# Patient Record
Sex: FEMALE | Race: BLACK OR AFRICAN AMERICAN | Employment: FULL TIME | URBAN - METROPOLITAN AREA
[De-identification: names, ages, dates, MRNs, and addresses within clinical notes are randomized per-mention and may not be internally consistent; named-entity substitution may affect disease eponyms.]

---

## 2017-07-04 DIAGNOSIS — F41.0 PANIC DISORDER WITHOUT AGORAPHOBIA: ICD-10-CM

## 2017-07-05 RX ORDER — ESCITALOPRAM OXALATE 20 MG/1
TABLET ORAL
Qty: 30 TABLET | Refills: 0 | Status: SHIPPED | OUTPATIENT
Start: 2017-07-05 | End: 2017-07-12

## 2017-07-12 ENCOUNTER — OFFICE VISIT (OUTPATIENT)
Dept: FAMILY MEDICINE CLINIC | Age: 32
End: 2017-07-12

## 2017-07-12 VITALS
DIASTOLIC BLOOD PRESSURE: 68 MMHG | SYSTOLIC BLOOD PRESSURE: 90 MMHG | HEART RATE: 64 BPM | BODY MASS INDEX: 34.31 KG/M2 | RESPIRATION RATE: 12 BRPM | HEIGHT: 64 IN | WEIGHT: 201 LBS

## 2017-07-12 DIAGNOSIS — F41.0 PANIC DISORDER WITHOUT AGORAPHOBIA: Primary | ICD-10-CM

## 2017-07-12 PROCEDURE — 99213 OFFICE O/P EST LOW 20 MIN: CPT | Performed by: FAMILY MEDICINE

## 2017-07-12 RX ORDER — MELOXICAM 15 MG/1
15 TABLET ORAL DAILY
Qty: 30 TABLET | Refills: 1 | Status: SHIPPED | OUTPATIENT
Start: 2017-07-12 | End: 2019-12-19

## 2017-07-12 ASSESSMENT — PATIENT HEALTH QUESTIONNAIRE - PHQ9
SUM OF ALL RESPONSES TO PHQ9 QUESTIONS 1 & 2: 0
SUM OF ALL RESPONSES TO PHQ QUESTIONS 1-9: 0
1. LITTLE INTEREST OR PLEASURE IN DOING THINGS: 0
2. FEELING DOWN, DEPRESSED OR HOPELESS: 0

## 2017-11-08 ENCOUNTER — OFFICE VISIT (OUTPATIENT)
Dept: FAMILY MEDICINE CLINIC | Age: 32
End: 2017-11-08

## 2017-11-08 VITALS
WEIGHT: 193 LBS | HEART RATE: 66 BPM | RESPIRATION RATE: 12 BRPM | SYSTOLIC BLOOD PRESSURE: 100 MMHG | BODY MASS INDEX: 32.95 KG/M2 | DIASTOLIC BLOOD PRESSURE: 66 MMHG | HEIGHT: 64 IN

## 2017-11-08 DIAGNOSIS — F41.0 PANIC DISORDER WITHOUT AGORAPHOBIA: Primary | ICD-10-CM

## 2017-11-08 PROCEDURE — 4004F PT TOBACCO SCREEN RCVD TLK: CPT | Performed by: FAMILY MEDICINE

## 2017-11-08 PROCEDURE — G8417 CALC BMI ABV UP PARAM F/U: HCPCS | Performed by: FAMILY MEDICINE

## 2017-11-08 PROCEDURE — 99213 OFFICE O/P EST LOW 20 MIN: CPT | Performed by: FAMILY MEDICINE

## 2017-11-08 PROCEDURE — G8427 DOCREV CUR MEDS BY ELIG CLIN: HCPCS | Performed by: FAMILY MEDICINE

## 2017-11-08 PROCEDURE — G8482 FLU IMMUNIZE ORDER/ADMIN: HCPCS | Performed by: FAMILY MEDICINE

## 2017-11-08 RX ORDER — ESCITALOPRAM OXALATE 10 MG/1
10 TABLET ORAL DAILY
Qty: 30 TABLET | Refills: 0 | Status: SHIPPED | OUTPATIENT
Start: 2017-11-08 | End: 2018-04-18 | Stop reason: ALTCHOICE

## 2017-11-08 NOTE — PROGRESS NOTES
Subjective:      Patient ID: Robert Maddox is a 28 y.o. female. HPI   CC:  Anxiety d/o    Mood Disorder:  Patient presents for follow-up of anxiety disorder. Current complaints include: worsening anxiety    When patient was last evaluated on 7/12/17, patient reported that she had stopped her anxiety medicine. She had recently been let go from her previous job at [employer], due to being unable to meet job requirement since she was a single mom and her son's  wasn't open as early as they wanted her to start. 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 She wanted to see how she can do off of her anxiety medicine. At her last visit, she is still doing very well off of her medicines. Today, patient states she still loves her job, and doesn't feel like the job brings stress to her life. She states she still does have lots of stresses in her life, especially being a single mom and taking care of her six-year-old son. She does feel that she does better on the Lexapro. She does have some daily baseline anxiety now, I would like to restart the Lexapro. She denies anhedonia, depressed mood, tearfulness, feelings of hopelessness, insomnia, panic attacks, increased use of drugs or alcohol and suicidal thoughts or behavior. Symptoms/signs of eber: none.   Vitals:    11/08/17 1449   BP: 100/66   Pulse: 66   Resp: 12   Weight: 193 lb (87.5 kg)   Height: 5' 3.75\" (1.619 m)       Outpatient Prescriptions Marked as Taking for the 11/8/17 encounter (Office Visit) with Isabell Pulido MD   Medication Sig Dispense Refill    Multiple Vitamin (MULTI VITAMIN DAILY PO) Take by mouth         Past Medical History:   Diagnosis Date    Cervical incompetence     Tobacco dependence        Past Surgical History:   Procedure Laterality Date    APPENDECTOMY  2005       Social History   Substance Use Topics    Smoking status: Current Every Day Smoker     Packs/day: 0.50     Years: 11.00     Types: Cigarettes    Smokeless tobacco: Never Used    Alcohol use 0.0 oz/week      Comment: twice monthly       Family History   Problem Relation Age of Onset    Breast Cancer Maternal Aunt 39    Cancer Maternal Grandmother      brain    Alzheimer's Disease Maternal Grandmother     Cancer Paternal Grandmother      lymphoma    High Blood Pressure Paternal Grandmother     Stroke             Review of Systems  See hpi    Objective:   Physical Exam  Constitutional:   · Reviewed vitals above  · Well Nourished, well developed, no distress       ·   Psych:  · Normal mood and affect  · Normal insight and judgement    Assessment:      See below      Plan:      1. Panic disorder without agoraphobia  Not currently controlled. Restarting Lexapro 10 mg.  Because patient has been on medicine before, told her okay to increase back up to her 20 mg dose after 2 weeks if she felt like this was needed. Asked that she might chart me in 2 weeks to let me know her plan. Reviewed side effects and expected course. Pt reported understanding. Patient promises to report any side effects, and any acute worsening of her symptoms. She does plan to see Dr. Stanton Calero for counseling as well.      15 minutes were spent on this visit, >50% spent with counseling

## 2017-12-07 ENCOUNTER — OFFICE VISIT (OUTPATIENT)
Dept: PSYCHOLOGY | Age: 32
End: 2017-12-07

## 2017-12-07 DIAGNOSIS — F43.10 PTSD (POST-TRAUMATIC STRESS DISORDER): Primary | ICD-10-CM

## 2017-12-07 PROCEDURE — 90791 PSYCH DIAGNOSTIC EVALUATION: CPT | Performed by: PSYCHOLOGIST

## 2017-12-07 NOTE — PATIENT INSTRUCTIONS
1. Schedule follow up with Dr. Ofelia Alejandre  2. If you can get hands on insurance therapy list: look for referrals that are psychologist, , professional clinical counselor  3. Continue to take Lexapro 10 mg at pm  4. Consider melatonin 3 to 5 mg for sleep trouble  5. Return to clinic for Dr. Amina Singh in 4 weeks     Sleep Hygiene Guidelines    Good dental hygiene is important in determining the health of your teeth and gums. We all know we are supposed to brush and floss regularly. Those who do so are more likely to have strong, healthy gums and less cavities. Similarly, good sleep hygiene is important in determining the quality and quantity of your sleep. Below are guidelines for good sleep hygiene practices. Review these guidelines and evaluate how well you practice good sleep hygiene. Caffeine:  Avoid Caffeine 6-8 Hours Before Bedtime       Caffeine disturbs sleep, even in people who do not think they experience a stimulation effect. Individuals with insomnia are often more sensitive to mild stimulants than are normal sleepers. Caffeine is found in items such as coffee, tea, soda, chocolate, and many over-the-counter medications (e.g., Excedrin). Thus, drinking caffeinated beverages should be avoided near bedtime and during the night. You might consider a trial period of no caffeine if you tend to be sensitive to its effects. Nicotine:  Avoid Nicotine Before Bedtime       Although some smokers claim that smoking helps them relax, but nicotine is a stimulant. The initial relaxing effects occur with the initial entry of the nicotine, but as the nicotine builds in the system it produces an effect similar to caffeine. Thus, smoking, dipping, or chewing tobacco should be avoided near bedtime and during the night. Dont smoke to get yourself back to sleep.     Alcohol:  Avoid Alcohol After Dinner       Alcohol often promotes the onset of sleep, but as alcohol is metabolized sleep becomes disturbed and fragmented. Thus, a large amount of alcohol is a poor sleep aid and should not be used as such. Limit alcohol use to small quantities to moderate quantities. Sleeping Pills:  Sleep Medications are Effective Only Temporarily       Scientists have shown that sleep medications lose their effectiveness in about 2 - 4 weeks when taken regularly. Despite advertisements to the contrary, over-the-counter sleeping aids have little impact on sleep beyond the placebo effect. Over time, sleeping pills actually can make sleep problems worse. When sleeping pills have been used for a long period, withdrawal from the medication can lead to an insomnia rebound. Thus, after long-term use, many individuals incorrectly conclude that they need sleeping pills in order to sleep normally. Keep use of sleep pills infrequent, but dont worry if you need t use one on an occasional basis. Regular Exercise       Get regular exercise, preferably 40 minutes each day of an activity that causes sweating. .  Exercise in the late afternoon or early evening seems to aid sleep, although the positive effect often takes several weeks to become noticeable. Exercising sporadically is not likely to improve sleep, and exercise within 2 hours of bedtime may elevate nervous system activity and interfere with sleep onset. Hot Baths  Spending 20 minutes in a tub of hot water an hour or two prior to bedtime may promote sleep and is strongly recommended. Bedroom Environment: Moderate Temperature, Quiet, and Dark       Extremes of heat or cold can disrupt sleep. A quiet environment is more sleep promoting than a noisy one. Noises can be masked with background white noise (such as the noise of a fan) or with earplugs. Bedrooms may be darkened with black-out shades or sleep masks can be worn. Position clocks out-of-sight since clock-watching can increase worry about the effects of lack of sleep.   Be sure your mattress is

## 2017-12-07 NOTE — PROGRESS NOTES
Behavioral Health Consultation  Manav Ramesh PsyD  Psychologist  12/7/2017  2:15 PM      Time spent with Patient: 30 minutes  This is patient's first  [facility]. Reason for Consult:  PTSD  Referring Provider: Elise Valencia MD  [address] [place] [address], [facility] 50    Feedback given to PCP. S:    Presenting Problem (PP):    Last appt: 6/9/16. Stopped medication June of this year, restarted about 1 month ago Lexapro 10 mg. Was supposed to go to [place] on 12/21 for vacation with father, again he let her down, still will have the time off, from 12/21 to 12/18   so will spend this time with her son. Other stressor: ex now  and has infant son. This has been more challenging to watch him be more engaged with his 2nd child than her son. 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 Doing well until pulled into \"middle school\" drama with nursing school in November 2016 which made school worse, pt had stood up for classmate. First half of 2017, mother became sick, was in coma for 1 1/2 months, for hospital 1 month, the rehab. She had pneumonia from MRSA working in neuro dept. Mother back at work, full recovery. Had to leave nursing school to take care of mother in [place]. Was fired from job after boss unwilling to change schedule in June. 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 Functional assessment/Typical day (how PP is affecting or being affected by. Ba [place] ):    Work: See stressor above   Academic: going back to January 2018, going to [employer], 6 credit hours, then submit for their nursing program, 1 1/2 year long program     O:  MSE:    Appearance    alert, cooperative  Appetite poor several days  Sleep disturbance  Alzheimer's Disease Maternal Grandmother     Cancer Paternal Grandmother      lymphoma    High Blood Pressure Paternal Grandmother     Stroke           A:    See S: above. Pt returning for another episode of care, last appt: 6/9/16. Chronic anxiety exacerbated by multiple external stressors: mother's health crisis which resulted in her having to leave nursing school to care for her, ex having another child, and loss of job back in June. Now working with [employer] which she is enjoying. Pt well aware of need for both medication and therapy as most effective anxiety treatment. Had stopped medication in June then re-started about 1 month ago, helping some. Here today to re-engage in supportive treatment. This sparked another discussion about readiness for more specific PTSD related treatment. I will bridge until this is in place. Pt going to get list of referrals, her insurance will change in January. Having some sleep trouble, so going to try melatonin which has worked in the past. F/u with me in 4 weeks. Needs f/u with PCP. Denied any si/hi risk, intent, or plan. Please note PHQ-9 and EMA-7 administered on 12/7, entered into chart on 12/8  [facility] Scores 12/8/2017 7/12/2017 9/18/2015   PHQ2 Score 4 0 0   PHQ9 Score 17 0 0     Interpretation of Total Score Depression Severity: 1-4 = Minimal depression, 5-9 = Mild depression, 10-14 = Moderate depression, 15-19 = Moderately severe depression, 20-27 = Severe depression    EMA 7 SCORE 12/8/2017   EMA-7 Total Score 13     Interpretation of EMA-7 score: 5-9 = mild anxiety, 10-14 = moderate anxiety, 15+ = severe anxiety. Recommend referral to behavioral health for scores 10 or greater.     Diagnosis:    Posttraumatic stress disorder      Diagnosis Date    Cervical incompetence     Tobacco dependence      Problems with primary support group, Problems related to the social environment and Occupational problems      Plan:  Pt interventions:    Practiced assertive communication, Trained in strategies for increasing balanced thinking, Discussed self-care (sleep, nutrition, rewarding activities, social support, exercise) and Supportive techniques    Pt Behavioral Change Plan:    1. Schedule follow up with Dr. Addi Santana  2. If you can get hands on insurance therapy list: look for referrals that are psychologist, , professional clinical counselor  3. Continue to take Lexapro 10 mg at pm  4. Consider melatonin 3 to 5 mg for sleep trouble  5.  Return to clinic for Dr. Mikala Campa in 4 weeks

## 2017-12-08 ASSESSMENT — PATIENT HEALTH QUESTIONNAIRE - PHQ9
8. MOVING OR SPEAKING SO SLOWLY THAT OTHER PEOPLE COULD HAVE NOTICED. OR THE OPPOSITE, BEING SO FIGETY OR RESTLESS THAT YOU HAVE BEEN MOVING AROUND A LOT MORE THAN USUAL: 2
7. TROUBLE CONCENTRATING ON THINGS, SUCH AS READING THE NEWSPAPER OR WATCHING TELEVISION: 2
5. POOR APPETITE OR OVEREATING: 1
6. FEELING BAD ABOUT YOURSELF - OR THAT YOU ARE A FAILURE OR HAVE LET YOURSELF OR YOUR FAMILY DOWN: 2
9. THOUGHTS THAT YOU WOULD BE BETTER OFF DEAD, OR OF HURTING YOURSELF: 1
2. FEELING DOWN, DEPRESSED OR HOPELESS: 2
SUM OF ALL RESPONSES TO PHQ QUESTIONS 1-9: 17
4. FEELING TIRED OR HAVING LITTLE ENERGY: 3
3. TROUBLE FALLING OR STAYING ASLEEP: 2
SUM OF ALL RESPONSES TO PHQ9 QUESTIONS 1 & 2: 4
10. IF YOU CHECKED OFF ANY PROBLEMS, HOW DIFFICULT HAVE THESE PROBLEMS MADE IT FOR YOU TO DO YOUR WORK, TAKE CARE OF THINGS AT HOME, OR GET ALONG WITH OTHER PEOPLE: 2
1. LITTLE INTEREST OR PLEASURE IN DOING THINGS: 2

## 2017-12-08 ASSESSMENT — ANXIETY QUESTIONNAIRES
3. WORRYING TOO MUCH ABOUT DIFFERENT THINGS: 1-SEVERAL DAYS
2. NOT BEING ABLE TO STOP OR CONTROL WORRYING: 1-SEVERAL DAYS
7. FEELING AFRAID AS IF SOMETHING AWFUL MIGHT HAPPEN: 2-OVER HALF THE DAYS
GAD7 TOTAL SCORE: 13
4. TROUBLE RELAXING: 2-OVER HALF THE DAYS
5. BEING SO RESTLESS THAT IT IS HARD TO SIT STILL: 2-OVER HALF THE DAYS
1. FEELING NERVOUS, ANXIOUS, OR ON EDGE: 2-OVER HALF THE DAYS
6. BECOMING EASILY ANNOYED OR IRRITABLE: 3-NEARLY EVERY DAY

## 2018-02-23 RX ORDER — ONDANSETRON 8 MG/1
8 TABLET, ORALLY DISINTEGRATING ORAL EVERY 8 HOURS PRN
Qty: 12 TABLET | Refills: 0 | Status: SHIPPED | OUTPATIENT
Start: 2018-02-23 | End: 2018-09-19 | Stop reason: ALTCHOICE

## 2018-03-08 ENCOUNTER — OFFICE VISIT (OUTPATIENT)
Dept: PSYCHOLOGY | Age: 33
End: 2018-03-08

## 2018-03-08 DIAGNOSIS — F43.10 PTSD (POST-TRAUMATIC STRESS DISORDER): Primary | ICD-10-CM

## 2018-03-08 PROCEDURE — 90832 PSYTX W PT 30 MINUTES: CPT | Performed by: PSYCHOLOGIST

## 2018-03-08 ASSESSMENT — PATIENT HEALTH QUESTIONNAIRE - PHQ9
8. MOVING OR SPEAKING SO SLOWLY THAT OTHER PEOPLE COULD HAVE NOTICED. OR THE OPPOSITE, BEING SO FIGETY OR RESTLESS THAT YOU HAVE BEEN MOVING AROUND A LOT MORE THAN USUAL: 2
3. TROUBLE FALLING OR STAYING ASLEEP: 2
2. FEELING DOWN, DEPRESSED OR HOPELESS: 1
9. THOUGHTS THAT YOU WOULD BE BETTER OFF DEAD, OR OF HURTING YOURSELF: 0
6. FEELING BAD ABOUT YOURSELF - OR THAT YOU ARE A FAILURE OR HAVE LET YOURSELF OR YOUR FAMILY DOWN: 2
4. FEELING TIRED OR HAVING LITTLE ENERGY: 2
SUM OF ALL RESPONSES TO PHQ9 QUESTIONS 1 & 2: 3
10. IF YOU CHECKED OFF ANY PROBLEMS, HOW DIFFICULT HAVE THESE PROBLEMS MADE IT FOR YOU TO DO YOUR WORK, TAKE CARE OF THINGS AT HOME, OR GET ALONG WITH OTHER PEOPLE: 1
7. TROUBLE CONCENTRATING ON THINGS, SUCH AS READING THE NEWSPAPER OR WATCHING TELEVISION: 1
SUM OF ALL RESPONSES TO PHQ QUESTIONS 1-9: 13
5. POOR APPETITE OR OVEREATING: 1
1. LITTLE INTEREST OR PLEASURE IN DOING THINGS: 2

## 2018-03-08 ASSESSMENT — ANXIETY QUESTIONNAIRES
5. BEING SO RESTLESS THAT IT IS HARD TO SIT STILL: 2-OVER HALF THE DAYS
6. BECOMING EASILY ANNOYED OR IRRITABLE: 2-OVER HALF THE DAYS
2. NOT BEING ABLE TO STOP OR CONTROL WORRYING: 1-SEVERAL DAYS
4. TROUBLE RELAXING: 2-OVER HALF THE DAYS
1. FEELING NERVOUS, ANXIOUS, OR ON EDGE: 2-OVER HALF THE DAYS
3. WORRYING TOO MUCH ABOUT DIFFERENT THINGS: 1-SEVERAL DAYS
GAD7 TOTAL SCORE: 11
7. FEELING AFRAID AS IF SOMETHING AWFUL MIGHT HAPPEN: 1-SEVERAL DAYS

## 2018-03-08 NOTE — PROGRESS NOTES
Behavioral Health Consultation Follow-up  Sury Hoyos PsyD  Psychologist  3/8/2018  3:48 PM      Time spent with Patient: 30 minutes  This is patient's second  [facility] appointment. Reason for Consult:  PTSD  Referring Provider: Galindo Lemus MD  [address] [place] [address].[facility] 372, [facility] 50    Feedback given to PCP. S:    Last appt: 12/7/17. Sleep is better. Picked up where we left off in terms of pursuing longer term psychotherapy referral. Reviewed her referral list from insurance company. Cleaning up sleep hygiene a bit, going to bed the same time. Completed smoking cessation program, replaced with exercise and running, still close with her female friend, Dory Hidalgo. They spend a lot of time together and still working out together. Going to run the marathon at [employer], had completed 1/2 [address] last year.  Has been using Anxiety and phobia workbook per my prescription to manage anxiety when she has \"flare ups\"    Stopped Lexapro 10 mg since December 2017.      O:  MSE:    Appearance    alert, cooperative  Appetite normal  Sleep disturbance better  Fatigue better  Loss of pleasure better  Impulsive behavior No  Speech    normal rate, normal volume and well articulated  Mood    Anxiety levels down, sleep better  Affect    Congruent with full range  Thought Content    intact  Thought Process    linear, goal directed and coherent  Associations    logical connections  Insight    Good  Judgment    Intact  Orientation    oriented to person, place, time, and general circumstances  Memory    recent and remote memory intact  Attention/Concentration    intact  Morbid ideation No  Suicide Assessment    no suicidal ideation      History:    Medications:   Current Outpatient Prescriptions   Medication Sig Dispense Refill    ondansetron (ZOFRAN ODT) 8 MG disintegrating tablet Take 1 tablet by mouth every 8 hours as needed for Nausea or Vomiting 12 tablet 0    escitalopram (LEXAPRO) 10 MG tablet Take 1 tablet by mouth daily 30 tablet 0    meloxicam (MOBIC) 15 MG tablet Take 1 tablet by mouth daily Take one tab 15 mg by mouth daily with food 30 tablet 1    Multiple Vitamin (MULTI VITAMIN DAILY PO) Take by mouth       No current facility-administered medications for this visit. Social History:   Social History     Social History    Marital status: Single     Spouse name: N/A    Number of children: N/A    Years of education: N/A     Occupational History    Medical Assistant      Social History Main Topics    Smoking status: Current Every Day Smoker     Packs/day: 0.50     Years: 11.00     Types: Cigarettes    Smokeless tobacco: Never Used    Alcohol use 0.0 oz/week      Comment: twice monthly    Drug use: No    Sexual activity: Not Currently     Other Topics Concern    Not on file     Social History Narrative    No narrative on file       TOBACCO:   reports that she has been smoking Cigarettes. She has a 5.50 pack-year smoking history. She has never used smokeless tobacco.  ETOH:   reports that she drinks alcohol. Family History:   Family History   Problem Relation Age of Onset    Breast Cancer Maternal Aunt 39    Cancer Maternal Grandmother      brain    Alzheimer's Disease Maternal Grandmother     Cancer Paternal Grandmother      lymphoma    High Blood Pressure Paternal Grandmother     Stroke           A:    Overall, pt doing well, stress levels down since December even after she discharged [facility] that month. Has really bumped up physical exercise and now training for [employer]. Had completed 1/2 marathon last year. Appears that increased exercise really help to reduce anxiety and we spent some time linking how her new training regimen supports PTSD treatment.  Rest of appt identified several longer term psychotherapists, reviewed her insurance list. Pt will call for new pt appt, meet with new therapist at least 1 x then schedule f/u with me in a few months or

## 2018-04-18 ENCOUNTER — OFFICE VISIT (OUTPATIENT)
Dept: FAMILY MEDICINE CLINIC | Age: 33
End: 2018-04-18

## 2018-04-18 VITALS
HEART RATE: 93 BPM | DIASTOLIC BLOOD PRESSURE: 79 MMHG | HEIGHT: 64 IN | SYSTOLIC BLOOD PRESSURE: 131 MMHG | RESPIRATION RATE: 12 BRPM | WEIGHT: 203 LBS | BODY MASS INDEX: 34.66 KG/M2

## 2018-04-18 DIAGNOSIS — R53.82 CHRONIC FATIGUE: ICD-10-CM

## 2018-04-18 DIAGNOSIS — G47.10 HYPERSOMNIA: Primary | ICD-10-CM

## 2018-04-18 LAB
A/G RATIO: 1.7 (ref 1.1–2.2)
ALBUMIN SERPL-MCNC: 4.5 G/DL (ref 3.4–5)
ALP BLD-CCNC: 48 U/L (ref 40–129)
ALT SERPL-CCNC: 17 U/L (ref 10–40)
ANION GAP SERPL CALCULATED.3IONS-SCNC: 14 MMOL/L (ref 3–16)
AST SERPL-CCNC: 16 U/L (ref 15–37)
BASOPHILS ABSOLUTE: 0 K/UL (ref 0–0.2)
BASOPHILS RELATIVE PERCENT: 0.5 %
BILIRUB SERPL-MCNC: <0.2 MG/DL (ref 0–1)
BUN BLDV-MCNC: 12 MG/DL (ref 7–20)
CALCIUM SERPL-MCNC: 9.3 MG/DL (ref 8.3–10.6)
CHLORIDE BLD-SCNC: 101 MMOL/L (ref 99–110)
CO2: 24 MMOL/L (ref 21–32)
CREAT SERPL-MCNC: 1.1 MG/DL (ref 0.6–1.1)
EOSINOPHILS ABSOLUTE: 0.1 K/UL (ref 0–0.6)
EOSINOPHILS RELATIVE PERCENT: 0.9 %
GFR AFRICAN AMERICAN: >60
GFR NON-AFRICAN AMERICAN: 57
GLOBULIN: 2.7 G/DL
GLUCOSE BLD-MCNC: 89 MG/DL (ref 70–99)
HCT VFR BLD CALC: 36.5 % (ref 36–48)
HEMOGLOBIN: 11.7 G/DL (ref 12–16)
LYMPHOCYTES ABSOLUTE: 2.7 K/UL (ref 1–5.1)
LYMPHOCYTES RELATIVE PERCENT: 32 %
MCH RBC QN AUTO: 24.6 PG (ref 26–34)
MCHC RBC AUTO-ENTMCNC: 32 G/DL (ref 31–36)
MCV RBC AUTO: 76.9 FL (ref 80–100)
MONOCYTES ABSOLUTE: 0.7 K/UL (ref 0–1.3)
MONOCYTES RELATIVE PERCENT: 8.2 %
NEUTROPHILS ABSOLUTE: 4.9 K/UL (ref 1.7–7.7)
NEUTROPHILS RELATIVE PERCENT: 58.4 %
PDW BLD-RTO: 15.3 % (ref 12.4–15.4)
PLATELET # BLD: 229 K/UL (ref 135–450)
PMV BLD AUTO: 9 FL (ref 5–10.5)
POTASSIUM SERPL-SCNC: 4.1 MMOL/L (ref 3.5–5.1)
RBC # BLD: 4.75 M/UL (ref 4–5.2)
SODIUM BLD-SCNC: 139 MMOL/L (ref 136–145)
TOTAL PROTEIN: 7.2 G/DL (ref 6.4–8.2)
TSH REFLEX: 0.77 UIU/ML (ref 0.27–4.2)
WBC # BLD: 8.4 K/UL (ref 4–11)

## 2018-04-18 PROCEDURE — 99214 OFFICE O/P EST MOD 30 MIN: CPT | Performed by: FAMILY MEDICINE

## 2018-04-18 PROCEDURE — 36415 COLL VENOUS BLD VENIPUNCTURE: CPT | Performed by: FAMILY MEDICINE

## 2018-05-10 ENCOUNTER — OFFICE VISIT (OUTPATIENT)
Dept: SLEEP MEDICINE | Age: 33
End: 2018-05-10

## 2018-05-10 VITALS
BODY MASS INDEX: 34.79 KG/M2 | OXYGEN SATURATION: 95 % | RESPIRATION RATE: 16 BRPM | HEART RATE: 77 BPM | HEIGHT: 64 IN | TEMPERATURE: 98.5 F | SYSTOLIC BLOOD PRESSURE: 94 MMHG | DIASTOLIC BLOOD PRESSURE: 64 MMHG | WEIGHT: 203.8 LBS

## 2018-05-10 DIAGNOSIS — F45.8 BRUXISM (TEETH GRINDING): ICD-10-CM

## 2018-05-10 DIAGNOSIS — F51.3 SLEEPWALKING: ICD-10-CM

## 2018-05-10 DIAGNOSIS — R68.89 VIVID DREAM: ICD-10-CM

## 2018-05-10 DIAGNOSIS — G47.19 EXCESSIVE DAYTIME SLEEPINESS: Primary | ICD-10-CM

## 2018-05-10 PROCEDURE — 99204 OFFICE O/P NEW MOD 45 MIN: CPT | Performed by: PSYCHIATRY & NEUROLOGY

## 2018-05-10 ASSESSMENT — SLEEP AND FATIGUE QUESTIONNAIRES
HOW LIKELY ARE YOU TO NOD OFF OR FALL ASLEEP WHILE SITTING AND READING: 2
ESS TOTAL SCORE: 16
HOW LIKELY ARE YOU TO NOD OFF OR FALL ASLEEP WHILE SITTING AND TALKING TO SOMEONE: 1
HOW LIKELY ARE YOU TO NOD OFF OR FALL ASLEEP WHILE SITTING INACTIVE IN A PUBLIC PLACE: 2
HOW LIKELY ARE YOU TO NOD OFF OR FALL ASLEEP WHEN YOU ARE A PASSENGER IN A CAR FOR AN HOUR WITHOUT A BREAK: 3
HOW LIKELY ARE YOU TO NOD OFF OR FALL ASLEEP WHILE SITTING QUIETLY AFTER LUNCH WITHOUT ALCOHOL: 2
HOW LIKELY ARE YOU TO NOD OFF OR FALL ASLEEP WHILE LYING DOWN TO REST IN THE AFTERNOON WHEN CIRCUMSTANCES PERMIT: 3
HOW LIKELY ARE YOU TO NOD OFF OR FALL ASLEEP WHILE WATCHING TV: 2
HOW LIKELY ARE YOU TO NOD OFF OR FALL ASLEEP IN A CAR, WHILE STOPPED FOR A FEW MINUTES IN TRAFFIC: 1
NECK CIRCUMFERENCE (INCHES): 15

## 2018-05-10 ASSESSMENT — ENCOUNTER SYMPTOMS
CHOKING: 0
COUGH: 0
EYES NEGATIVE: 1
GASTROINTESTINAL NEGATIVE: 1
RESPIRATORY NEGATIVE: 1
APNEA: 0
ALLERGIC/IMMUNOLOGIC NEGATIVE: 1

## 2018-05-23 ENCOUNTER — E-VISIT (OUTPATIENT)
Dept: FAMILY MEDICINE CLINIC | Age: 33
End: 2018-05-23

## 2018-05-23 DIAGNOSIS — J01.00 ACUTE NON-RECURRENT MAXILLARY SINUSITIS: Primary | ICD-10-CM

## 2018-05-23 PROCEDURE — 99444 PR PHYSICIAN ONLINE EVALUATION & MANAGEMENT SERVICE: CPT | Performed by: FAMILY MEDICINE

## 2018-05-23 RX ORDER — AMOXICILLIN 500 MG/1
1000 CAPSULE ORAL 2 TIMES DAILY
Qty: 40 CAPSULE | Refills: 0 | Status: SHIPPED | OUTPATIENT
Start: 2018-05-23 | End: 2018-06-02

## 2018-05-23 ASSESSMENT — LIFESTYLE VARIABLES
HISTORY_OF_SMOKING: I HAVE SMOKED REGULARLY FOR 10-20 YEARS
SMOKING_STATUS: I HAVE SMOKED REGULARLY FOR 10-20 YEARS

## 2018-06-27 PROCEDURE — 95810 POLYSOM 6/> YRS 4/> PARAM: CPT | Performed by: PSYCHIATRY & NEUROLOGY

## 2018-06-28 ENCOUNTER — HOSPITAL ENCOUNTER (OUTPATIENT)
Dept: OTHER | Age: 33
Discharge: HOME | End: 2018-06-29
Attending: PSYCHIATRY & NEUROLOGY | Admitting: PSYCHIATRY & NEUROLOGY

## 2018-06-28 ENCOUNTER — HOSPITAL ENCOUNTER (OUTPATIENT)
Dept: OTHER | Age: 33
Discharge: HOME | End: 2018-06-29

## 2018-06-28 DIAGNOSIS — G47.19 EXCESSIVE DAYTIME SLEEPINESS: ICD-10-CM

## 2018-06-28 DIAGNOSIS — F51.3 SLEEPWALKING: ICD-10-CM

## 2018-06-28 DIAGNOSIS — R68.89 VIVID DREAM: ICD-10-CM

## 2018-06-29 ENCOUNTER — TELEPHONE (OUTPATIENT)
Dept: PULMONOLOGY | Age: 33
End: 2018-06-29

## 2018-06-29 DIAGNOSIS — G47.19 EXCESSIVE DAYTIME SLEEPINESS: ICD-10-CM

## 2018-06-29 LAB
AMPHETAMINE SCREEN, URINE: NORMAL
BARBITURATE SCREEN URINE: NORMAL
BENZODIAZEPINE SCREEN, URINE: NORMAL
CANNABINOID SCREEN URINE: NORMAL
COCAINE METABOLITE SCREEN URINE: NORMAL
Lab: NORMAL
METHADONE SCREEN, URINE: NORMAL
OPIATE SCREEN URINE: NORMAL
OXYCODONE URINE: NORMAL
PH UA: 6
PHENCYCLIDINE SCREEN URINE: NORMAL
PROPOXYPHENE SCREEN: NORMAL

## 2018-07-12 ENCOUNTER — OFFICE VISIT (OUTPATIENT)
Dept: SLEEP MEDICINE | Age: 33
End: 2018-07-12

## 2018-07-12 VITALS
HEART RATE: 78 BPM | DIASTOLIC BLOOD PRESSURE: 62 MMHG | WEIGHT: 200 LBS | HEIGHT: 64 IN | SYSTOLIC BLOOD PRESSURE: 102 MMHG | BODY MASS INDEX: 34.15 KG/M2 | TEMPERATURE: 98.4 F

## 2018-07-12 DIAGNOSIS — G47.11 IDIOPATHIC HYPERSOMNOLENCE: Primary | ICD-10-CM

## 2018-07-12 PROCEDURE — 99213 OFFICE O/P EST LOW 20 MIN: CPT | Performed by: PSYCHIATRY & NEUROLOGY

## 2018-07-12 RX ORDER — ARMODAFINIL 150 MG/1
TABLET ORAL
Qty: 30 TABLET | Refills: 5 | Status: SHIPPED | OUTPATIENT
Start: 2018-07-12 | End: 2018-07-12

## 2018-07-12 NOTE — PROGRESS NOTES
MD AZEB King Board Certified in Sleep Medicine  Certified in [facility] Gonzalez yohan Certified in Neurology [address], [address] ([place] [address] [place] [address], [address] [place] [address] Adriana [address] SLEEP MEDICINE [place]    Subjective:     Patient ID: Elke Horta is a 28 y.o. female. Chief Complaint   Patient presents with    Follow-Up from Hospital      Discuss MSLT results from June 27/28       HPI:        Elke Horta is a 28 y.o. female was seen today as a follow for daytime sleepiness. The patient underwent comprehensive polysomnogram on 06/27/2018, the overnight registration revealed no significant sleep disordered breathing with apnea hypopnea index of 1.8/h with lowest O2 saturation of 93%, patient spent about 0 minutes below 90%. Subsequently, the patient underwent MSLT , the mean sleep onset latency was 7.2 minutes without any REM sleep, the MSLT confirmed the daytime sleepiness, however the diagnosis of Narcolepsy could not be confirmed.    The Patient scored 16 on Hanna Sleepiness Scale ( more than 10 is indicative of daytime sleepiness)       DOT/CDL - No        Previous Report(s) Reviewed: historical medical records         Social History     Social History    Marital status: Single     Spouse name: N/A    Number of children: N/A    Years of education: N/A     Occupational History    Medical Assistant      Social History Main Topics    Smoking status: Current Every Day Smoker     Packs/day: 0.50     Years: 11.00     Types: Cigarettes    Smokeless tobacco: Never Used    Alcohol use 0.0 oz/week      Comment: twice monthly    Drug use: No    Sexual activity: Not Currently     Other Topics Concern    Not on file     Social History Narrative    No narrative on file       Prior to Admission 07/12/18 78   05/10/18 77   04/18/18 93    SpO2 Readings from Last 3 Encounters:   05/10/18 95%        The mandibular molar Class :   [x]1 []2 []3      Mallampati I Airway Classification:   []1 [x]2 []3 []4      Physical Exam   Constitutional: No distress. HENT:   Nose: Nose normal.   Eyes: EOM are normal.   Neck: Normal range of motion. Neck supple. No tracheal deviation present. No thyromegaly present. Cardiovascular: Normal rate, normal heart sounds and intact distal pulses. Pulmonary/Chest: Effort normal and breath sounds normal. No respiratory distress. She has no wheezes. Musculoskeletal: Normal range of motion. She exhibits no edema or tenderness. Neurological: She is alert. She has normal reflexes. No cranial nerve deficit. Skin: Skin is warm. Psychiatric: She has a normal mood and affect. Nursing note and vitals reviewed. Assessment:        Diagnosis Orders   1. Idiopathic hypersomnolence  Armodafinil (NUVIGIL) 150 MG TABS tablet     Plan:         I will try Nuvigil for her daytime sleepiness. We discussed the side effects of the new drug     No orders of the defined types were placed in this encounter. Return in about 6 months (around 1/12/2019) for EDS.     Catalina Arshad MD  Medical Director [address] [place] [address]

## 2018-08-01 ENCOUNTER — TELEPHONE (OUTPATIENT)
Dept: PULMONOLOGY | Age: 33
End: 2018-08-01

## 2018-08-01 DIAGNOSIS — G47.11 IDIOPATHIC HYPERSOMNOLENCE: Primary | ICD-10-CM

## 2018-08-13 RX ORDER — ARMODAFINIL 250 MG/1
TABLET ORAL
Qty: 30 TABLET | Refills: 5 | Status: SHIPPED | OUTPATIENT
Start: 2018-08-13 | End: 2018-08-20 | Stop reason: SDUPTHER

## 2018-08-20 DIAGNOSIS — G47.11 IDIOPATHIC HYPERSOMNOLENCE: ICD-10-CM

## 2018-08-20 RX ORDER — ARMODAFINIL 250 MG/1
TABLET ORAL
Qty: 30 TABLET | Refills: 5 | Status: SHIPPED | OUTPATIENT
Start: 2018-08-20 | End: 2019-01-24 | Stop reason: SDUPTHER

## 2018-09-19 ENCOUNTER — OFFICE VISIT (OUTPATIENT)
Dept: FAMILY MEDICINE CLINIC | Age: 33
End: 2018-09-19

## 2018-09-19 VITALS — WEIGHT: 205 LBS | BODY MASS INDEX: 35 KG/M2 | HEIGHT: 64 IN

## 2018-09-19 DIAGNOSIS — F17.200 TOBACCO DEPENDENCE: ICD-10-CM

## 2018-09-19 DIAGNOSIS — Z13.220 SCREENING FOR HYPERLIPIDEMIA: ICD-10-CM

## 2018-09-19 DIAGNOSIS — Z13.1 SCREENING FOR DIABETES MELLITUS: ICD-10-CM

## 2018-09-19 DIAGNOSIS — Z00.00 WELL ADULT EXAM: Primary | ICD-10-CM

## 2018-09-19 PROCEDURE — 99395 PREV VISIT EST AGE 18-39: CPT | Performed by: FAMILY MEDICINE

## 2018-09-19 RX ORDER — VARENICLINE TARTRATE 25 MG
KIT ORAL
Qty: 1 EACH | Refills: 0 | Status: SHIPPED | OUTPATIENT
Start: 2018-09-19 | End: 2019-02-04

## 2018-09-19 NOTE — PROGRESS NOTES
Constitutional: She is oriented to person, place, and time. She appears well-developed and well-nourished. No distress. HEENT:   Head: Normocephalic and atraumatic. Right Ear: Tympanic membrane, external ear and ear canal normal.   Left Ear: Tympanic membrane, external ear and ear canal normal.   Nose: Nose normal.   Mouth/Throat: Oropharynx is clear and moist, and mucous membranes are normal.  There is no cervical adenopathy. Eyes: Conjunctivae and extraocular motions are normal. Pupils are equal, round, and reactive to light. Neck: Neck supple. No JVD present. Carotid bruit is not present. No mass and no thyromegaly present. Cardiovascular: Normal rate, regular rhythm, normal heart sounds and intact distal pulses. Exam reveals no gallop and no friction rub. No murmur heard. Pulmonary/Chest: Effort normal and breath sounds normal. No respiratory distress. She has no wheezes, rhonchi or rales. Abdominal: Soft, non-tender. Bowel sounds and aorta are normal. She exhibits no organomegaly, mass or bruit. Genitourinary: performed by gynecologist.  Breast exam: performed by gynecologist.  Musculoskeletal: Normal range of motion, no synovitis. She exhibits no edema. Neurological: She is alert and oriented to person, place, and time. She has normal reflexes. No cranial nerve deficit. Coordination normal.   Skin: Skin is warm and dry. There is no rash or erythema. No suspicious lesions noted. Psychiatric: She has a normal mood and affect. Her speech is normal and behavior is normal. Judgment, cognition and memory are normal.     Assessment/Plan:    Mariza Morse was seen today for annual exam.    Diagnoses and all orders for this visit:    Well adult exam  Normal exam    Gets flu shot at work    -     Lipid Panel; Future  -     Glucose; Future    Tobacco dependence  Prescribing the following:  -     varenicline (CHANTIX STARTING MONTH PAK) 0.5 MG X 11 & 1 MG X 42 tablet; Take by mouth.     Will check with

## 2018-11-07 ENCOUNTER — OFFICE VISIT (OUTPATIENT)
Dept: PSYCHIATRY | Age: 33
End: 2018-11-07
Payer: COMMERCIAL

## 2018-11-07 VITALS
WEIGHT: 205 LBS | DIASTOLIC BLOOD PRESSURE: 88 MMHG | HEART RATE: 96 BPM | BODY MASS INDEX: 36.32 KG/M2 | SYSTOLIC BLOOD PRESSURE: 134 MMHG | HEIGHT: 63 IN

## 2018-11-07 DIAGNOSIS — F41.1 GAD (GENERALIZED ANXIETY DISORDER): ICD-10-CM

## 2018-11-07 DIAGNOSIS — F41.0 PANIC DISORDER WITHOUT AGORAPHOBIA: ICD-10-CM

## 2018-11-07 DIAGNOSIS — F43.10 PTSD (POST-TRAUMATIC STRESS DISORDER): Primary | ICD-10-CM

## 2018-11-07 PROCEDURE — 99204 OFFICE O/P NEW MOD 45 MIN: CPT | Performed by: NURSE PRACTITIONER

## 2018-11-07 ASSESSMENT — ANXIETY QUESTIONNAIRES
GAD7 TOTAL SCORE: 19
6. BECOMING EASILY ANNOYED OR IRRITABLE: 2-OVER HALF THE DAYS
2. NOT BEING ABLE TO STOP OR CONTROL WORRYING: 3-NEARLY EVERY DAY
7. FEELING AFRAID AS IF SOMETHING AWFUL MIGHT HAPPEN: 2-OVER HALF THE DAYS
3. WORRYING TOO MUCH ABOUT DIFFERENT THINGS: 3-NEARLY EVERY DAY
1. FEELING NERVOUS, ANXIOUS, OR ON EDGE: 3-NEARLY EVERY DAY
5. BEING SO RESTLESS THAT IT IS HARD TO SIT STILL: 3-NEARLY EVERY DAY
4. TROUBLE RELAXING: 3-NEARLY EVERY DAY

## 2018-11-07 ASSESSMENT — PATIENT HEALTH QUESTIONNAIRE - PHQ9
1. LITTLE INTEREST OR PLEASURE IN DOING THINGS: 2
SUM OF ALL RESPONSES TO PHQ QUESTIONS 1-9: 21
SUM OF ALL RESPONSES TO PHQ QUESTIONS 1-9: 21
10. IF YOU CHECKED OFF ANY PROBLEMS, HOW DIFFICULT HAVE THESE PROBLEMS MADE IT FOR YOU TO DO YOUR WORK, TAKE CARE OF THINGS AT HOME, OR GET ALONG WITH OTHER PEOPLE: 1
9. THOUGHTS THAT YOU WOULD BE BETTER OFF DEAD, OR OF HURTING YOURSELF: 1
6. FEELING BAD ABOUT YOURSELF - OR THAT YOU ARE A FAILURE OR HAVE LET YOURSELF OR YOUR FAMILY DOWN: 3
2. FEELING DOWN, DEPRESSED OR HOPELESS: 2
3. TROUBLE FALLING OR STAYING ASLEEP: 3
7. TROUBLE CONCENTRATING ON THINGS, SUCH AS READING THE NEWSPAPER OR WATCHING TELEVISION: 2
4. FEELING TIRED OR HAVING LITTLE ENERGY: 3
5. POOR APPETITE OR OVEREATING: 2
SUM OF ALL RESPONSES TO PHQ9 QUESTIONS 1 & 2: 4
8. MOVING OR SPEAKING SO SLOWLY THAT OTHER PEOPLE COULD HAVE NOTICED. OR THE OPPOSITE, BEING SO FIGETY OR RESTLESS THAT YOU HAVE BEEN MOVING AROUND A LOT MORE THAN USUAL: 3
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
## 2018-11-21 ENCOUNTER — OFFICE VISIT (OUTPATIENT)
Dept: PSYCHIATRY | Age: 33
End: 2018-11-21
Payer: COMMERCIAL

## 2018-11-21 VITALS
HEART RATE: 86 BPM | SYSTOLIC BLOOD PRESSURE: 128 MMHG | DIASTOLIC BLOOD PRESSURE: 85 MMHG | HEIGHT: 64 IN | BODY MASS INDEX: 34.15 KG/M2 | WEIGHT: 200 LBS

## 2018-11-21 DIAGNOSIS — F43.10 PTSD (POST-TRAUMATIC STRESS DISORDER): Primary | ICD-10-CM

## 2018-11-21 DIAGNOSIS — F41.1 GAD (GENERALIZED ANXIETY DISORDER): ICD-10-CM

## 2018-11-21 DIAGNOSIS — F41.0 PANIC DISORDER WITHOUT AGORAPHOBIA: ICD-10-CM

## 2018-11-21 PROCEDURE — 99214 OFFICE O/P EST MOD 30 MIN: CPT | Performed by: NURSE PRACTITIONER

## 2018-11-21 RX ORDER — VILAZODONE HYDROCHLORIDE 20 MG/1
20 TABLET ORAL DAILY
Qty: 30 TABLET | Refills: 3 | Status: SHIPPED | OUTPATIENT
Start: 2018-11-21 | End: 2018-12-19

## 2018-11-21 ASSESSMENT — PATIENT HEALTH QUESTIONNAIRE - PHQ9
10. IF YOU CHECKED OFF ANY PROBLEMS, HOW DIFFICULT HAVE THESE PROBLEMS MADE IT FOR YOU TO DO YOUR WORK, TAKE CARE OF THINGS AT HOME, OR GET ALONG WITH OTHER PEOPLE: 0
3. TROUBLE FALLING OR STAYING ASLEEP: 3
2. FEELING DOWN, DEPRESSED OR HOPELESS: 2
4. FEELING TIRED OR HAVING LITTLE ENERGY: 3
9. THOUGHTS THAT YOU WOULD BE BETTER OFF DEAD, OR OF HURTING YOURSELF: 1
7. TROUBLE CONCENTRATING ON THINGS, SUCH AS READING THE NEWSPAPER OR WATCHING TELEVISION: 2
1. LITTLE INTEREST OR PLEASURE IN DOING THINGS: 2
8. MOVING OR SPEAKING SO SLOWLY THAT OTHER PEOPLE COULD HAVE NOTICED. OR THE OPPOSITE, BEING SO FIGETY OR RESTLESS THAT YOU HAVE BEEN MOVING AROUND A LOT MORE THAN USUAL: 1
6. FEELING BAD ABOUT YOURSELF - OR THAT YOU ARE A FAILURE OR HAVE LET YOURSELF OR YOUR FAMILY DOWN: 3
SUM OF ALL RESPONSES TO PHQ QUESTIONS 1-9: 20
5. POOR APPETITE OR OVEREATING: 3
SUM OF ALL RESPONSES TO PHQ9 QUESTIONS 1 & 2: 4
SUM OF ALL RESPONSES TO PHQ QUESTIONS 1-9: 20

## 2018-11-21 ASSESSMENT — ANXIETY QUESTIONNAIRES
4. TROUBLE RELAXING: 2-OVER HALF THE DAYS
7. FEELING AFRAID AS IF SOMETHING AWFUL MIGHT HAPPEN: 2-OVER HALF THE DAYS
2. NOT BEING ABLE TO STOP OR CONTROL WORRYING: 2-OVER HALF THE DAYS
6. BECOMING EASILY ANNOYED OR IRRITABLE: 2-OVER HALF THE DAYS
1. FEELING NERVOUS, ANXIOUS, OR ON EDGE: 1-SEVERAL DAYS
GAD7 TOTAL SCORE: 11
3. WORRYING TOO MUCH ABOUT DIFFERENT THINGS: 2-OVER HALF THE DAYS
5. BEING SO RESTLESS THAT IT IS HARD TO SIT STILL: 0-NOT AT ALL SURE
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
## 2018-12-12 ENCOUNTER — TELEPHONE (OUTPATIENT)
Dept: PSYCHIATRY | Age: 33
End: 2018-12-12

## 2018-12-12 RX ORDER — HYDROXYZINE HYDROCHLORIDE 25 MG/1
25 TABLET, FILM COATED ORAL NIGHTLY
Qty: 30 TABLET | Refills: 0 | Status: SHIPPED | OUTPATIENT
Start: 2018-12-12 | End: 2019-01-11

## 2018-12-19 ENCOUNTER — OFFICE VISIT (OUTPATIENT)
Dept: PSYCHIATRY | Age: 33
End: 2018-12-19
Payer: COMMERCIAL

## 2018-12-19 VITALS
HEART RATE: 74 BPM | HEIGHT: 64 IN | SYSTOLIC BLOOD PRESSURE: 120 MMHG | DIASTOLIC BLOOD PRESSURE: 74 MMHG | BODY MASS INDEX: 34.83 KG/M2 | WEIGHT: 204 LBS

## 2018-12-19 DIAGNOSIS — F41.1 GAD (GENERALIZED ANXIETY DISORDER): ICD-10-CM

## 2018-12-19 DIAGNOSIS — F43.10 PTSD (POST-TRAUMATIC STRESS DISORDER): Primary | ICD-10-CM

## 2018-12-19 DIAGNOSIS — F41.0 PANIC DISORDER WITHOUT AGORAPHOBIA: ICD-10-CM

## 2018-12-19 PROCEDURE — 99214 OFFICE O/P EST MOD 30 MIN: CPT | Performed by: NURSE PRACTITIONER

## 2018-12-19 RX ORDER — FLUOXETINE 10 MG/1
10 CAPSULE ORAL DAILY
Qty: 30 CAPSULE | Refills: 3 | Status: SHIPPED | OUTPATIENT
Start: 2018-12-19 | End: 2019-02-04

## 2018-12-19 ASSESSMENT — ANXIETY QUESTIONNAIRES
2. NOT BEING ABLE TO STOP OR CONTROL WORRYING: 2-OVER HALF THE DAYS
7. FEELING AFRAID AS IF SOMETHING AWFUL MIGHT HAPPEN: 2-OVER HALF THE DAYS
GAD7 TOTAL SCORE: 14
6. BECOMING EASILY ANNOYED OR IRRITABLE: 2-OVER HALF THE DAYS
4. TROUBLE RELAXING: 2-OVER HALF THE DAYS
3. WORRYING TOO MUCH ABOUT DIFFERENT THINGS: 2-OVER HALF THE DAYS
1. FEELING NERVOUS, ANXIOUS, OR ON EDGE: 3-NEARLY EVERY DAY
5. BEING SO RESTLESS THAT IT IS HARD TO SIT STILL: 1-SEVERAL DAYS

## 2018-12-19 ASSESSMENT — PATIENT HEALTH QUESTIONNAIRE - PHQ9
8. MOVING OR SPEAKING SO SLOWLY THAT OTHER PEOPLE COULD HAVE NOTICED. OR THE OPPOSITE, BEING SO FIGETY OR RESTLESS THAT YOU HAVE BEEN MOVING AROUND A LOT MORE THAN USUAL: 2
1. LITTLE INTEREST OR PLEASURE IN DOING THINGS: 2
6. FEELING BAD ABOUT YOURSELF - OR THAT YOU ARE A FAILURE OR HAVE LET YOURSELF OR YOUR FAMILY DOWN: 2
7. TROUBLE CONCENTRATING ON THINGS, SUCH AS READING THE NEWSPAPER OR WATCHING TELEVISION: 2
SUM OF ALL RESPONSES TO PHQ QUESTIONS 1-9: 20
4. FEELING TIRED OR HAVING LITTLE ENERGY: 3
9. THOUGHTS THAT YOU WOULD BE BETTER OFF DEAD, OR OF HURTING YOURSELF: 1
5. POOR APPETITE OR OVEREATING: 3
10. IF YOU CHECKED OFF ANY PROBLEMS, HOW DIFFICULT HAVE THESE PROBLEMS MADE IT FOR YOU TO DO YOUR WORK, TAKE CARE OF THINGS AT HOME, OR GET ALONG WITH OTHER PEOPLE: 2
SUM OF ALL RESPONSES TO PHQ QUESTIONS 1-9: 20
2. FEELING DOWN, DEPRESSED OR HOPELESS: 2
3. TROUBLE FALLING OR STAYING ASLEEP: 3
SUM OF ALL RESPONSES TO PHQ9 QUESTIONS 1 & 2: 4

## 2018-12-19 NOTE — PROGRESS NOTES
to psych myself up to do anything. Always have an escape plan. Will use any viable excuse to leave social events so doesn't have to stay too long     Had several stressors over past couple years. 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 Some days can maintain happy persona. Other days don't want to be talked to. Some days feel like screaming. Feel bad for my co- workers. Put all my effort into patients. Not fair to them. Not very nice at times.       Dx sleep disorder, 6/2018. Rx armodofinil past few months. Now staying awake. Energy remains low. Now that is more awake and aware, anxiety more prevalent     Had seen tracy frost for therapy in past.  [facility], insurance didn't cover. Was going to do emdr for ptsd, but couldn't start d/t financial constraints.       Son's dad sees him every other weeknd. Is remarried, has another child. Now the father wants to spend more time with son. His wife has to have things done certain way, pushy. Pt has an agreement in place with dad, then things get changed because she (ex's wife) wants it certain way. Pt feels helpeless, feels like don't have a choice because would hurt her son if didn't allow to see his father. Fears one day they will say they want full custody, feels is heading that direction. Thinks they would say she doesn't take him anywhere because of anxiety. Struggled with idea of seeking treatment, doesn't want it held against her but motivated for treatment because doesn't want to hold him back from doing things. Work becomes trigger, don't leave on time. Son at . Has discussed with manager.   Feels guilt for being away from son        HPI:   Jose Robledo Sujatha Paulino is a 35 y.o. female with h/o anxiety, panic and ptsd  who p/t clinic for follow up. Since last visit 11/21/18, taking viibryd inconsistently. was making her feel sick (nauseated for hours) on days she took it. Tried taking with food. Within 3 hours of taking was feeling nauseated and sick. Tried moving to bedtime. Unable to sleep and still felt nauseated. Struggling with sleep for \"a while\". At least the past month. Prior to starting viibryd. Mind racing at night. Can feel when armodofinil has worn off but still can't sleep. Head still going. Replays events from the day. Won't stop. Started watching [employer] at night. Can help distract from personal thoughts/ruminations. Takes hours to fall asleep. Not asleep until after 3am.  Alarm goes off at 530. Doesn't recall turning off alarm this am.  Was rushing to get to work and son to school on time    Bathroom done. Workers officially out of house. Now hot water heater doesn't work, no hot water in the home. Mood:  \"not the greatest\". Rates 4-5/10 (10 best). Can't sleep. Can't shut thoughts off. Exhausted all day, can nod off during the day despite armodofinil.   isolated. Longstanding \"introvert\". Stays busy with son who's very active and social.  Constantly busy with things at home but feels nothing getting done. Appetite in waves. Overeat vs no appetite. Hard to do self care d/t no shower d/t no hot water. Anxiety:  Constant, rates 8/10 (10 worst). Can go to 10. Triggered in crowds. Only to store for [employer] shopping 1-2 times. Felt like going to explode waiting in line at [facility], felt people were standing too close. Goes to less busy stores. Can still have nausea in afternoons even if doesn't take viibryd. Denies chance of pregnancy. HA's daily. Often sits in car in driveway after getting home to decompress from anxiety and stress of day.   Doesn't want son to see how she's truly feeling    Considering

## 2019-01-02 ENCOUNTER — OFFICE VISIT (OUTPATIENT)
Dept: PSYCHIATRY | Age: 34
End: 2019-01-02
Payer: COMMERCIAL

## 2019-01-02 VITALS
WEIGHT: 204.4 LBS | BODY MASS INDEX: 34.89 KG/M2 | DIASTOLIC BLOOD PRESSURE: 64 MMHG | HEIGHT: 64 IN | SYSTOLIC BLOOD PRESSURE: 110 MMHG | HEART RATE: 70 BPM

## 2019-01-02 DIAGNOSIS — F43.10 PTSD (POST-TRAUMATIC STRESS DISORDER): Primary | ICD-10-CM

## 2019-01-02 DIAGNOSIS — F41.0 PANIC DISORDER WITHOUT AGORAPHOBIA: ICD-10-CM

## 2019-01-02 DIAGNOSIS — F41.1 GAD (GENERALIZED ANXIETY DISORDER): ICD-10-CM

## 2019-01-02 PROCEDURE — 99214 OFFICE O/P EST MOD 30 MIN: CPT | Performed by: NURSE PRACTITIONER

## 2019-01-02 RX ORDER — CLONAZEPAM 0.5 MG/1
0.5 TABLET ORAL DAILY PRN
Qty: 30 TABLET | Refills: 0 | Status: SHIPPED | OUTPATIENT
Start: 2019-01-02 | End: 2019-07-15

## 2019-01-02 ASSESSMENT — ANXIETY QUESTIONNAIRES
1. FEELING NERVOUS, ANXIOUS, OR ON EDGE: 2-OVER HALF THE DAYS
6. BECOMING EASILY ANNOYED OR IRRITABLE: 2-OVER HALF THE DAYS
7. FEELING AFRAID AS IF SOMETHING AWFUL MIGHT HAPPEN: 2-OVER HALF THE DAYS
5. BEING SO RESTLESS THAT IT IS HARD TO SIT STILL: 1-SEVERAL DAYS
3. WORRYING TOO MUCH ABOUT DIFFERENT THINGS: 2-OVER HALF THE DAYS
4. TROUBLE RELAXING: 2-OVER HALF THE DAYS
2. NOT BEING ABLE TO STOP OR CONTROL WORRYING: 1-SEVERAL DAYS
GAD7 TOTAL SCORE: 12

## 2019-01-02 ASSESSMENT — PATIENT HEALTH QUESTIONNAIRE - PHQ9
SUM OF ALL RESPONSES TO PHQ9 QUESTIONS 1 & 2: 4
SUM OF ALL RESPONSES TO PHQ QUESTIONS 1-9: 17
SUM OF ALL RESPONSES TO PHQ QUESTIONS 1-9: 17
9. THOUGHTS THAT YOU WOULD BE BETTER OFF DEAD, OR OF HURTING YOURSELF: 1
6. FEELING BAD ABOUT YOURSELF - OR THAT YOU ARE A FAILURE OR HAVE LET YOURSELF OR YOUR FAMILY DOWN: 3
4. FEELING TIRED OR HAVING LITTLE ENERGY: 2
2. FEELING DOWN, DEPRESSED OR HOPELESS: 2
8. MOVING OR SPEAKING SO SLOWLY THAT OTHER PEOPLE COULD HAVE NOTICED. OR THE OPPOSITE, BEING SO FIGETY OR RESTLESS THAT YOU HAVE BEEN MOVING AROUND A LOT MORE THAN USUAL: 2
5. POOR APPETITE OR OVEREATING: 2
3. TROUBLE FALLING OR STAYING ASLEEP: 1
1. LITTLE INTEREST OR PLEASURE IN DOING THINGS: 2
10. IF YOU CHECKED OFF ANY PROBLEMS, HOW DIFFICULT HAVE THESE PROBLEMS MADE IT FOR YOU TO DO YOUR WORK, TAKE CARE OF THINGS AT HOME, OR GET ALONG WITH OTHER PEOPLE: 2
7. TROUBLE CONCENTRATING ON THINGS, SUCH AS READING THE NEWSPAPER OR WATCHING TELEVISION: 2

## 2019-01-09 ENCOUNTER — TELEPHONE (OUTPATIENT)
Dept: PSYCHIATRY | Age: 34
End: 2019-01-09

## 2019-01-24 ENCOUNTER — OFFICE VISIT (OUTPATIENT)
Dept: SLEEP MEDICINE | Age: 34
End: 2019-01-24
Payer: COMMERCIAL

## 2019-01-24 VITALS
BODY MASS INDEX: 35.79 KG/M2 | HEIGHT: 63 IN | TEMPERATURE: 99.3 F | OXYGEN SATURATION: 98 % | RESPIRATION RATE: 16 BRPM | DIASTOLIC BLOOD PRESSURE: 74 MMHG | WEIGHT: 202 LBS | HEART RATE: 102 BPM | SYSTOLIC BLOOD PRESSURE: 126 MMHG

## 2019-01-24 DIAGNOSIS — Z13.220 SCREENING FOR HYPERLIPIDEMIA: ICD-10-CM

## 2019-01-24 DIAGNOSIS — R53.83 FATIGUE, UNSPECIFIED TYPE: ICD-10-CM

## 2019-01-24 DIAGNOSIS — G47.11 IDIOPATHIC HYPERSOMNOLENCE: Primary | ICD-10-CM

## 2019-01-24 DIAGNOSIS — Z13.1 SCREENING FOR DIABETES MELLITUS: ICD-10-CM

## 2019-01-24 LAB
CHOLESTEROL, TOTAL: 179 MG/DL (ref 0–199)
GLUCOSE BLD-MCNC: 89 MG/DL (ref 70–99)
HDLC SERPL-MCNC: 47 MG/DL (ref 40–60)
LDL CHOLESTEROL CALCULATED: 112 MG/DL
TRIGL SERPL-MCNC: 100 MG/DL (ref 0–150)
VLDLC SERPL CALC-MCNC: 20 MG/DL

## 2019-01-24 PROCEDURE — 99213 OFFICE O/P EST LOW 20 MIN: CPT | Performed by: PSYCHIATRY & NEUROLOGY

## 2019-01-24 RX ORDER — ARMODAFINIL 250 MG/1
TABLET ORAL
Qty: 30 TABLET | Refills: 5 | Status: SHIPPED | OUTPATIENT
Start: 2019-01-24 | End: 2019-04-19 | Stop reason: SDUPTHER

## 2019-01-24 ASSESSMENT — SLEEP AND FATIGUE QUESTIONNAIRES
HOW LIKELY ARE YOU TO NOD OFF OR FALL ASLEEP WHEN YOU ARE A PASSENGER IN A CAR FOR AN HOUR WITHOUT A BREAK: 1
HOW LIKELY ARE YOU TO NOD OFF OR FALL ASLEEP WHILE SITTING AND TALKING TO SOMEONE: 2
HOW LIKELY ARE YOU TO NOD OFF OR FALL ASLEEP WHILE SITTING AND READING: 1
HOW LIKELY ARE YOU TO NOD OFF OR FALL ASLEEP IN A CAR, WHILE STOPPED FOR A FEW MINUTES IN TRAFFIC: 0
HOW LIKELY ARE YOU TO NOD OFF OR FALL ASLEEP WHILE WATCHING TV: 2
HOW LIKELY ARE YOU TO NOD OFF OR FALL ASLEEP WHILE SITTING QUIETLY AFTER LUNCH WITHOUT ALCOHOL: 2
HOW LIKELY ARE YOU TO NOD OFF OR FALL ASLEEP WHILE LYING DOWN TO REST IN THE AFTERNOON WHEN CIRCUMSTANCES PERMIT: 3
HOW LIKELY ARE YOU TO NOD OFF OR FALL ASLEEP WHILE SITTING INACTIVE IN A PUBLIC PLACE: 1
ESS TOTAL SCORE: 12

## 2019-01-24 ASSESSMENT — ENCOUNTER SYMPTOMS
APNEA: 0
CHOKING: 0

## 2019-01-28 ENCOUNTER — HOSPITAL ENCOUNTER (EMERGENCY)
Age: 34
Discharge: HOME | End: 2019-01-28
Attending: EMERGENCY MEDICINE
Payer: COMMERCIAL

## 2019-01-28 VITALS
HEIGHT: 64 IN | BODY MASS INDEX: 33.8 KG/M2 | TEMPERATURE: 98 F | SYSTOLIC BLOOD PRESSURE: 111 MMHG | DIASTOLIC BLOOD PRESSURE: 75 MMHG | OXYGEN SATURATION: 99 % | WEIGHT: 198 LBS | RESPIRATION RATE: 18 BRPM | HEART RATE: 91 BPM

## 2019-01-28 DIAGNOSIS — F41.0 ANXIETY ATTACK: Primary | ICD-10-CM

## 2019-01-28 LAB
EKG ATRIAL RATE: 91 BPM
EKG DIAGNOSIS: NORMAL
EKG P AXIS: 34 DEGREES
EKG P-R INTERVAL: 168 MS
EKG Q-T INTERVAL: 334 MS
EKG QRS DURATION: 80 MS
EKG QTC CALCULATION (BAZETT): 410 MS
EKG R AXIS: 29 DEGREES
EKG T AXIS: 12 DEGREES
EKG VENTRICULAR RATE: 91 BPM

## 2019-01-28 PROCEDURE — 93005 ELECTROCARDIOGRAM TRACING: CPT | Performed by: EMERGENCY MEDICINE

## 2019-01-28 PROCEDURE — 6370000000 HC RX 637 (ALT 250 FOR IP): Performed by: EMERGENCY MEDICINE

## 2019-01-28 PROCEDURE — 93010 ELECTROCARDIOGRAM REPORT: CPT | Performed by: INTERNAL MEDICINE

## 2019-01-28 PROCEDURE — 99283 EMERGENCY DEPT VISIT LOW MDM: CPT

## 2019-01-28 RX ORDER — LORAZEPAM 1 MG/1
1 TABLET ORAL ONCE
Status: COMPLETED | OUTPATIENT
Start: 2019-01-28 | End: 2019-01-28

## 2019-01-28 RX ADMIN — LORAZEPAM 1 MG: 1 TABLET ORAL at 14:32

## 2019-02-04 ENCOUNTER — OFFICE VISIT (OUTPATIENT)
Dept: PSYCHIATRY | Age: 34
End: 2019-02-04
Payer: COMMERCIAL

## 2019-02-04 VITALS
HEIGHT: 64 IN | BODY MASS INDEX: 35.17 KG/M2 | WEIGHT: 206 LBS | HEART RATE: 74 BPM | SYSTOLIC BLOOD PRESSURE: 120 MMHG | DIASTOLIC BLOOD PRESSURE: 82 MMHG

## 2019-02-04 DIAGNOSIS — F41.0 PANIC DISORDER WITHOUT AGORAPHOBIA: ICD-10-CM

## 2019-02-04 DIAGNOSIS — F41.1 GAD (GENERALIZED ANXIETY DISORDER): ICD-10-CM

## 2019-02-04 DIAGNOSIS — F43.10 PTSD (POST-TRAUMATIC STRESS DISORDER): Primary | ICD-10-CM

## 2019-02-04 PROCEDURE — 99214 OFFICE O/P EST MOD 30 MIN: CPT | Performed by: NURSE PRACTITIONER

## 2019-02-04 RX ORDER — VENLAFAXINE HYDROCHLORIDE 37.5 MG/1
37.5 CAPSULE, EXTENDED RELEASE ORAL DAILY
Qty: 30 CAPSULE | Refills: 3 | Status: SHIPPED | OUTPATIENT
Start: 2019-02-04 | End: 2019-03-07

## 2019-02-04 RX ORDER — LORAZEPAM 0.5 MG/1
0.5 TABLET ORAL 2 TIMES DAILY PRN
Qty: 28 TABLET | Refills: 0 | Status: SHIPPED | OUTPATIENT
Start: 2019-02-04 | End: 2019-02-18

## 2019-02-04 ASSESSMENT — PATIENT HEALTH QUESTIONNAIRE - PHQ9
7. TROUBLE CONCENTRATING ON THINGS, SUCH AS READING THE NEWSPAPER OR WATCHING TELEVISION: 3
SUM OF ALL RESPONSES TO PHQ QUESTIONS 1-9: 25
2. FEELING DOWN, DEPRESSED OR HOPELESS: 3
SUM OF ALL RESPONSES TO PHQ QUESTIONS 1-9: 25
8. MOVING OR SPEAKING SO SLOWLY THAT OTHER PEOPLE COULD HAVE NOTICED. OR THE OPPOSITE, BEING SO FIGETY OR RESTLESS THAT YOU HAVE BEEN MOVING AROUND A LOT MORE THAN USUAL: 2
5. POOR APPETITE OR OVEREATING: 3
1. LITTLE INTEREST OR PLEASURE IN DOING THINGS: 3
SUM OF ALL RESPONSES TO PHQ9 QUESTIONS 1 & 2: 6
10. IF YOU CHECKED OFF ANY PROBLEMS, HOW DIFFICULT HAVE THESE PROBLEMS MADE IT FOR YOU TO DO YOUR WORK, TAKE CARE OF THINGS AT HOME, OR GET ALONG WITH OTHER PEOPLE: 2
4. FEELING TIRED OR HAVING LITTLE ENERGY: 3
6. FEELING BAD ABOUT YOURSELF - OR THAT YOU ARE A FAILURE OR HAVE LET YOURSELF OR YOUR FAMILY DOWN: 3
9. THOUGHTS THAT YOU WOULD BE BETTER OFF DEAD, OR OF HURTING YOURSELF: 2
3. TROUBLE FALLING OR STAYING ASLEEP: 3

## 2019-02-04 ASSESSMENT — ANXIETY QUESTIONNAIRES
1. FEELING NERVOUS, ANXIOUS, OR ON EDGE: 3-NEARLY EVERY DAY
6. BECOMING EASILY ANNOYED OR IRRITABLE: 2-OVER HALF THE DAYS
2. NOT BEING ABLE TO STOP OR CONTROL WORRYING: 3-NEARLY EVERY DAY
3. WORRYING TOO MUCH ABOUT DIFFERENT THINGS: 3-NEARLY EVERY DAY
GAD7 TOTAL SCORE: 19
4. TROUBLE RELAXING: 3-NEARLY EVERY DAY
7. FEELING AFRAID AS IF SOMETHING AWFUL MIGHT HAPPEN: 3-NEARLY EVERY DAY
5. BEING SO RESTLESS THAT IT IS HARD TO SIT STILL: 2-OVER HALF THE DAYS

## 2019-02-15 ENCOUNTER — CLINICAL DOCUMENTATION (OUTPATIENT)
Dept: PSYCHIATRY | Age: 34
End: 2019-02-15

## 2019-02-19 ENCOUNTER — CLINICAL DOCUMENTATION (OUTPATIENT)
Dept: PSYCHIATRY | Age: 34
End: 2019-02-19

## 2019-03-07 ENCOUNTER — OFFICE VISIT (OUTPATIENT)
Dept: PSYCHIATRY | Age: 34
End: 2019-03-07
Payer: COMMERCIAL

## 2019-03-07 VITALS
BODY MASS INDEX: 34.35 KG/M2 | DIASTOLIC BLOOD PRESSURE: 74 MMHG | WEIGHT: 201.2 LBS | SYSTOLIC BLOOD PRESSURE: 120 MMHG | HEIGHT: 64 IN

## 2019-03-07 DIAGNOSIS — F41.0 PANIC DISORDER WITHOUT AGORAPHOBIA: ICD-10-CM

## 2019-03-07 DIAGNOSIS — F41.1 GAD (GENERALIZED ANXIETY DISORDER): ICD-10-CM

## 2019-03-07 DIAGNOSIS — F43.10 PTSD (POST-TRAUMATIC STRESS DISORDER): Primary | ICD-10-CM

## 2019-03-07 PROCEDURE — 99214 OFFICE O/P EST MOD 30 MIN: CPT | Performed by: NURSE PRACTITIONER

## 2019-03-07 RX ORDER — VENLAFAXINE HYDROCHLORIDE 75 MG/1
75 CAPSULE, EXTENDED RELEASE ORAL DAILY
Qty: 30 CAPSULE | Refills: 3 | Status: SHIPPED | OUTPATIENT
Start: 2019-03-07 | End: 2019-08-29

## 2019-03-07 ASSESSMENT — PATIENT HEALTH QUESTIONNAIRE - PHQ9
SUM OF ALL RESPONSES TO PHQ9 QUESTIONS 1 & 2: 4
1. LITTLE INTEREST OR PLEASURE IN DOING THINGS: 2
4. FEELING TIRED OR HAVING LITTLE ENERGY: 2
9. THOUGHTS THAT YOU WOULD BE BETTER OFF DEAD, OR OF HURTING YOURSELF: 1
SUM OF ALL RESPONSES TO PHQ QUESTIONS 1-9: 17
6. FEELING BAD ABOUT YOURSELF - OR THAT YOU ARE A FAILURE OR HAVE LET YOURSELF OR YOUR FAMILY DOWN: 1
SUM OF ALL RESPONSES TO PHQ QUESTIONS 1-9: 17
3. TROUBLE FALLING OR STAYING ASLEEP: 3
8. MOVING OR SPEAKING SO SLOWLY THAT OTHER PEOPLE COULD HAVE NOTICED. OR THE OPPOSITE, BEING SO FIGETY OR RESTLESS THAT YOU HAVE BEEN MOVING AROUND A LOT MORE THAN USUAL: 1
10. IF YOU CHECKED OFF ANY PROBLEMS, HOW DIFFICULT HAVE THESE PROBLEMS MADE IT FOR YOU TO DO YOUR WORK, TAKE CARE OF THINGS AT HOME, OR GET ALONG WITH OTHER PEOPLE: 1
5. POOR APPETITE OR OVEREATING: 3
2. FEELING DOWN, DEPRESSED OR HOPELESS: 2
7. TROUBLE CONCENTRATING ON THINGS, SUCH AS READING THE NEWSPAPER OR WATCHING TELEVISION: 2

## 2019-03-07 ASSESSMENT — ANXIETY QUESTIONNAIRES
GAD7 TOTAL SCORE: 15
4. TROUBLE RELAXING: 1-SEVERAL DAYS
2. NOT BEING ABLE TO STOP OR CONTROL WORRYING: 3-NEARLY EVERY DAY
3. WORRYING TOO MUCH ABOUT DIFFERENT THINGS: 3-NEARLY EVERY DAY
6. BECOMING EASILY ANNOYED OR IRRITABLE: 2-OVER HALF THE DAYS
1. FEELING NERVOUS, ANXIOUS, OR ON EDGE: 3-NEARLY EVERY DAY
7. FEELING AFRAID AS IF SOMETHING AWFUL MIGHT HAPPEN: 1-SEVERAL DAYS
5. BEING SO RESTLESS THAT IT IS HARD TO SIT STILL: 2-OVER HALF THE DAYS

## 2019-04-05 ENCOUNTER — OFFICE VISIT (OUTPATIENT)
Dept: FAMILY MEDICINE CLINIC | Age: 34
End: 2019-04-05
Payer: COMMERCIAL

## 2019-04-05 VITALS
SYSTOLIC BLOOD PRESSURE: 111 MMHG | DIASTOLIC BLOOD PRESSURE: 75 MMHG | HEART RATE: 90 BPM | WEIGHT: 201 LBS | BODY MASS INDEX: 34.31 KG/M2 | HEIGHT: 64 IN

## 2019-04-05 DIAGNOSIS — F41.0 PANIC DISORDER: Primary | ICD-10-CM

## 2019-04-05 PROCEDURE — 99212 OFFICE O/P EST SF 10 MIN: CPT | Performed by: FAMILY MEDICINE

## 2019-04-05 NOTE — PROGRESS NOTES
PROGRESS NOTE     Ruben Hernandez MD  [facility] [address] [place] [address]  [phone] office  [phone] fax    Date of Service:  4/5/2019    Subjective:      Patient ID: . Camille Dover is a 35 y.o. female      CC: panic d/o    HPI  24-year-old -American female was originally scheduled for a Pap smear, but states she started her menses, so does not want to have her Pap smear today. She did want to discuss future treatment of her panic disorder. She is seeing both psychiatry and psychology, and is currently off work due to the severity of her panic attacks. There is some discussion about her starting a beta blocker, and she wanted my opinion about whether or not she could tolerate this given her normal blood pressure at the moment. She is taking Klonopin and Effexor as prescribed. They are helping, along with therapy, but she has a lot of physical symptoms such as elevated heart rate when she has panic attacks, that a beta blocker may be able to help. Vitals:    04/05/19 1147   BP: 111/75   Pulse: 90   Weight: 201 lb (91.2 kg)   Height: 5' 3.75\" (1.619 m)       Outpatient Medications Marked as Taking for the 4/5/19 encounter (Office Visit) with Ryan Taveras MD   Medication Sig Dispense Refill    venlafaxine (EFFEXOR XR) 75 MG extended release capsule Take 1 capsule by mouth daily 30 capsule 3    Armodafinil 250 MG TABS One tab QAM prn. 30 tablet 5    Multiple Vitamin (MULTI VITAMIN DAILY PO) Take by mouth         Past Medical History:   Diagnosis Date    Cervical incompetence     Tobacco dependence        Past Surgical History:   Procedure Laterality Date    APPENDECTOMY  2005       Social History     Tobacco Use    Smoking status: Current Every Day Smoker     Packs/day: 0.50     Years: 11.00     Pack years: 5.50     Types: Cigarettes    Smokeless tobacco: Never Used   Substance Use Topics    Alcohol use:  Yes     Alcohol/week: 0.0 oz

## 2019-04-15 ENCOUNTER — OFFICE VISIT (OUTPATIENT)
Dept: PSYCHIATRY | Age: 34
End: 2019-04-15
Payer: COMMERCIAL

## 2019-04-15 VITALS
BODY MASS INDEX: 33.46 KG/M2 | HEART RATE: 88 BPM | WEIGHT: 196 LBS | DIASTOLIC BLOOD PRESSURE: 96 MMHG | SYSTOLIC BLOOD PRESSURE: 129 MMHG | HEIGHT: 64 IN

## 2019-04-15 DIAGNOSIS — F41.0 PANIC DISORDER WITHOUT AGORAPHOBIA: ICD-10-CM

## 2019-04-15 DIAGNOSIS — F41.1 GAD (GENERALIZED ANXIETY DISORDER): ICD-10-CM

## 2019-04-15 DIAGNOSIS — F43.10 PTSD (POST-TRAUMATIC STRESS DISORDER): Primary | ICD-10-CM

## 2019-04-15 PROCEDURE — 99214 OFFICE O/P EST MOD 30 MIN: CPT | Performed by: NURSE PRACTITIONER

## 2019-04-15 RX ORDER — PROPRANOLOL HYDROCHLORIDE 10 MG/1
10 TABLET ORAL 3 TIMES DAILY
Qty: 90 TABLET | Refills: 3 | Status: SHIPPED | OUTPATIENT
Start: 2019-04-15 | End: 2019-08-29

## 2019-04-15 ASSESSMENT — ANXIETY QUESTIONNAIRES
3. WORRYING TOO MUCH ABOUT DIFFERENT THINGS: 3-NEARLY EVERY DAY
2. NOT BEING ABLE TO STOP OR CONTROL WORRYING: 2-OVER HALF THE DAYS
5. BEING SO RESTLESS THAT IT IS HARD TO SIT STILL: 2-OVER HALF THE DAYS
7. FEELING AFRAID AS IF SOMETHING AWFUL MIGHT HAPPEN: 1-SEVERAL DAYS
GAD7 TOTAL SCORE: 15
1. FEELING NERVOUS, ANXIOUS, OR ON EDGE: 3-NEARLY EVERY DAY
6. BECOMING EASILY ANNOYED OR IRRITABLE: 2-OVER HALF THE DAYS
4. TROUBLE RELAXING: 2-OVER HALF THE DAYS

## 2019-04-15 ASSESSMENT — PATIENT HEALTH QUESTIONNAIRE - PHQ9
8. MOVING OR SPEAKING SO SLOWLY THAT OTHER PEOPLE COULD HAVE NOTICED. OR THE OPPOSITE, BEING SO FIGETY OR RESTLESS THAT YOU HAVE BEEN MOVING AROUND A LOT MORE THAN USUAL: 1
3. TROUBLE FALLING OR STAYING ASLEEP: 2
7. TROUBLE CONCENTRATING ON THINGS, SUCH AS READING THE NEWSPAPER OR WATCHING TELEVISION: 1
SUM OF ALL RESPONSES TO PHQ QUESTIONS 1-9: 13
9. THOUGHTS THAT YOU WOULD BE BETTER OFF DEAD, OR OF HURTING YOURSELF: 1
6. FEELING BAD ABOUT YOURSELF - OR THAT YOU ARE A FAILURE OR HAVE LET YOURSELF OR YOUR FAMILY DOWN: 1
5. POOR APPETITE OR OVEREATING: 2
4. FEELING TIRED OR HAVING LITTLE ENERGY: 1
SUM OF ALL RESPONSES TO PHQ QUESTIONS 1-9: 13
SUM OF ALL RESPONSES TO PHQ9 QUESTIONS 1 & 2: 4
2. FEELING DOWN, DEPRESSED OR HOPELESS: 2
10. IF YOU CHECKED OFF ANY PROBLEMS, HOW DIFFICULT HAVE THESE PROBLEMS MADE IT FOR YOU TO DO YOUR WORK, TAKE CARE OF THINGS AT HOME, OR GET ALONG WITH OTHER PEOPLE: 1
1. LITTLE INTEREST OR PLEASURE IN DOING THINGS: 2

## 2019-04-15 NOTE — PROGRESS NOTES
PSYCHIATRY PROGRESS NOTE    Kiara Mcarthur  1985   4/15/19        Face to Face time: 30 mins  CC:   Chief Complaint   Patient presents with    Follow-up     Per excerpt of initial eval with this provider 11/2018:    Justin Benji  \"I'm a mess\". 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 Says \"I know it's bad when i'm referred by dr Sancho Mosqueda. Told me \"I know you're fighting a lot more than youre letting on. Don't want to lose you. \"      Longstanding h/o anxiety. Can recall not wanting to sharpen pencil in  because feared people would watch her so sat in seat and cried because was terrified to move. First panic attack at 8th grade science fair. From then on, knew couldn't be in middle of gym. Struggles in crowds, can't be in middle, has to be in open space away from others. In school would eat lunch with counselor for part of middle school, half of sabino year, and all of senior year d/t severe anxiety. Is challenge because son is social butterfly. Hard for me because I feel like I hold him back. Terrifies me to do things. Don't want to hold him back      Routine driven. Struggles with change. Likes to know what's anticipated of her. Recent issues with work, unpredictable with schedule. Feels guilt for leaving son at  if has to stay late.       Describes panic attacks as chest \"locking up\", struggles to breathe.       Had episode in college was one minute late to class. As soon as walked in felt everyone's eyes on her, staring at her. Passed out. Woke up to professor and people standing over her. Now if late will not enter class, would sit outside of class and take notes but would refuse to walk in late. If 5 people on elevator, won't get on elevator or will exit and take stairs. Lived on 11th floor of dorm, would take stairs often     Everyday, always anxious. Feels like bottle of pop been shaken up, feels like ready to explode. Have to psych myself up to do anything. Always have an escape plan. Will use any viable excuse to leave social events so doesn't have to stay too long     Had several stressors over past couple years. 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 Some days can maintain happy persona. Other days don't want to be talked to. Some days feel like screaming. Feel bad for my co- workers. Put all my effort into patients. Not fair to them. Not very nice at times.       Dx sleep disorder, 6/2018. Rx armodofinil past few months. Now staying awake. Energy remains low. Now that is more awake and aware, anxiety more prevalent     Had seen tracy frost for therapy in past.  Was referred to \[facility]""ed growth, insurance didn't cover. Was going to do emdr for ptsd, but couldn't start d/t financial constraints.       Son's dad sees him every other weeknd. Is remarried, has another child. Now the father wants to spend more time with son. His wife has to have things done certain way, pushy. Pt has an agreement in place with dad, then things get changed because she (ex's wife) wants it certain way. Pt feels helpeless, feels like don't have a choice because would hurt her son if didn't allow to see his father. Fears one day they will say they want full custody, feels is heading that direction. Thinks they would say she doesn't take him anywhere because of anxiety. Struggled with idea of seeking treatment, doesn't want it held against her but motivated for treatment because doesn't want to hold him back from doing things. Work becomes trigger, don't leave on time. Son at . Has discussed with manager.   Feels guilt for being away from son        HPI:   Fabio Hicks is a 35 y.o. female with h/o anxiety, panic and ptsd  who p/t clinic for follow up. Since last visit 3/7/19, has been \"kind of in the middle. Have good days/bad days. More bad than good\"    Taking effexor 75mg daily. In am.  Denies se's. Can tell a difference. Still gets really anxious. Therapist calls them something, not the \"big ones\" [panic attacks] that are brief. Are extended feelings of being on edge, can last hours or all day. Therapist also suggested adding propanolol. Pt worries about this d/t h/o low bp. Saw pcp to discuss with her, she was ok with it. Just wants her to f/u to get bp check. Be mindful with positional changes    Therapist talking about trying to stop physiological manifestations of the anxiety so can focus on psychological component. Finally had workers put water heater in. Was really hard. Had gone months without hot water (since December). Friend stayed on phone with her entire time    Substance: Tobacco:  1ppd cigarettes, goes in waves depending on how feeling   Etoh:occ, decreased from last.    usually 1-2 drinks. Illicts:  Denies   Caffeine:  Reduced to 1-1.5 cups was 3-4 cups coffee/day      Therapist:  Colton Larios, Psych [facility]. Sees 1 time/week depends on his schedule. Has appt next week. He wanted her to start propanolol and be on for 2 weeks to see if helps physical manifestations of anxiety and to monitor bp. Also wonders if should find different job entirely. Dislikes beauracy of job. Lots drama there still despite some recent staff changes. Says the triggering staff still there. Doesn't anticipate this will change    Some days heart racing, others not. Denies cp. May have \"lump in throat\" on bad days. Monitors hr via fitbit. Can fluctuate. Triggers primarily centered around work; also some with son's father's family    over weekend Went to [place] with best friend, there all day for dance competition. Feels dance \"is my home\". Did well.  Was a lot of people but managed. Some irritability by end of day after 10-12 hours but attributes more to not smoking all day      Better concentration/focus, remembering to get bills paid. Less feeling of being scattered. Now worse off financially because of short term disability. Everything in arrears. Struggles to make decisions    Prior hairlilne fracture right ankle, feeling better, went hiking. No longer bracing. FROM. swelling in all joints improved. Has used ativan couple times. Helps on days has prolonged periods of anxiety. Last took last week. Maybe twice/week    Ongoing feeling skin crawling or like things crawling on her over past week. Can be anytime, more during high anxiety times. Historically happened usually at home, at night. Decreased frequency seeing things at home, \"blinking in and out of reality\". Still having dissociative episodes, can't tell if things real or not       Psych ROS:      Depression: Rates 5/10 (10 best). Sleep \"hit or miss\" waking some nights or can't fall asleep,   Tried to reduce visual stimulants by covering alarm, tv to have dark quiet space. Racing thoughts at night. Energy still low during day. Most days remains isolative. Drops off/picks up son daily from school. Taking him to karate 2-3 times/week. Hasn't been to gym lately. Did start trying [employer] for extra money. Had cancelled multiple shifts. First successful shift when had son with her. Hasn't been to grocery for self \"don't have money to do it\"  Longstanding \"introvert\". Stays busy with son Shaila Dean) who's very active and social.    Constantly busy with things at home but feels nothing getting done. Appetite low, food doesn't sound good. Little better with adls since now has hot water. ogoing hopelessness and helplessness. Ongoing tearfulness, daily    Less SI. Occasional passive fleeting thoughts, denies plan or intent.   Denies HI     Ana: DENIES insomnia with increased energy, rapid speech, easily distracted or decreased attention, irritability, racing thoughts, expansive mood, increase in energy and goal directed behavior, grandiosity, flight of ideas     Anxiety: constant worry, generalized (more recently worried someone going to take son because of anxiety), occ irritability, sleep disruption, somatic complaints (headaches, trembling, nausea, dyspnea, diaphoreis, muscle tension), restlessness, fatigue; fear of doing/saying wrong things, fear of judgement, avoidant of social situations    Constant, most days rates 8/10 (10 worst),    Triggered in crowds. Goes to less busy stores. Some nausea.  + HA's, intermittent. Often sits in car in driveway after getting home to decompress from anxiety and stress of day. Doesn't want son to see how she's truly feeling    Considering looking for new job d/t stress of current position     OCD:  need for symmetry and organization, will increase during times of high stress; has 2 of things; DENIES Repetitive actions or rituals, excessive hand washing, contamination fears, , violent thoughts, hoarding, fear of being harmed, mental or verbal repetition of words or phrases and counting     Panic:   Intermittent episodes, has had approx 3 times this month where had to escape situation. Has episodes of sustained high anxiety weekly. Were every time left therapist office. [place] couldn't be around anyone.   Historically lasts until gets out of situation; has passed out in past; Shortness of breath, dizziness, diaphoresis, trembling, palpitations, feeing of choking, nausea, feeling outside of self, numbness, chills, hot flashes, chest discomfort and fear of dying    Psychosis: if panicky/anxious state, feels like things crawling on her, feels like something on bed; other denies A/VH, paranoia, delusions     ADHD:  denies     PTSD: nightmares (frequent, mostly work related)  Rare flashbacks, hypervigilance, easily startled, decreased sleep, reliving the event, avoiding situations that remind you of trauma, physical and mental paralysis when reminded of the experience, same despair, easily angry or irritable, trouble concentrating, fear for safety,  Numbness of emotions, feeling of detachment; dissociative episodes     Eating disorders: DENIES RECENTsome binge eating over past 1-2 years. If frustrated or upset will hit several fast food places; infrequent; + guilt; uses as way to control situation she otherwise feels no control over       EMA 7 SCORE 4/15/2019 3/7/2019 2/4/2019 1/2/2019 12/19/2018 11/21/2018 11/7/2018   EMA-7 Total Score 15 15 19 12 14 11 19     Interpretation of EMA-7 score: 5-9 = mild anxiety, 10-14 = moderate anxiety,   15+ = severe anxiety. Recommend referral to behavioral health for scores 10 or greater. PHQ-9 Total Score: 13 (4/15/2019 10:43 AM)  PHQ-9 Total Score: 17 (3/7/2019 11:02 AM)  PHQ-9 Total Score: 25 (2/4/2019 10:14 AM)  PHQ-9 Total Score: 17 (1/2/2019 11:45 AM)   PHQ-9 Total Score: 20 (12/19/2018  1:04 PM)  PHQ-9 Total Score: 20 (11/21/2018  1:07 PM)   PHQ-9 Total Score: 21 (11/7/2018  9:05 AM)  Interpretation of PHQ-9 score:  1-4 = minimal depression, 5-9 = mild depression,   10-14 = moderate depression; 15-19 = moderately severe depression, 20-27 = severe depression      Past Psychiatric History:               Prior hospitalizations: denies              Prior diagnoses:  EMA, panic d/o, ptsd without agoraphobia              Outpatient Treatment:                           Psychiatrist: denies                          Therapist: todd frost; some school therapists in past              Suicide Attempts:  denies              Hx SH:  denies     Past Psychopharmacologic Trials (including response/reactions):  1. Lexapro:  Stopped 11/2017, was tired all time, couldn't function; started running (was racing), felt dulled, was on 20mg/day  2. Clonazepam:  Still technically on this, has in case of \"shut down moment\". 2/4/19          OBJECTIVE:  Vitals: Wt Readings from Last 3 Encounters:   04/15/19 196 lb (88.9 kg)   04/05/19 201 lb (91.2 kg)   03/07/19 201 lb 3.2 oz (91.3 kg)       Vitals:    04/15/19 1042   BP: (!) 129/96   Site: Left Upper Arm   Position: Sitting   Cuff Size: Medium Adult   Pulse: 88   Weight: 196 lb (88.9 kg)   Height: 5' 3.75\" (1.619 m)           ROS: Denies trouble with fever, rash, headache, vision changes, chest pain, shortness of breath, nausea, extremity pain, weakness, dysuria.      Mental Status Exam:      Appearance    alert, cooperative, appropriate dress for season, adequate hygiene, appears younger than stated age  Muscle strength/tone: no atrophy or abnormal movements  Gait/station: normal  Speech    spontaneous, normal rate, soft volume and well articulated  Mood    Anxious  Depressed  Low self-esteem  Shame  Humiliation  Demoralization  Affect    anxiety Congruent to thought content and mood  Thought Content    excessive guilt, excessive preoccupations, intrusive thoughts, paranoid thoughts, cognitive distortions and all or nothing thinking, no delusions voiced  Thought Process    coherent   Associations    logical connections  Perceptions: denies AH/VH, does not appear preoccupied with the internal environment  Insight    Good  Judgment    Intact  Orientation    oriented to person, place, time, and general circumstances  Memory    recent and remote memory intact  Attention/Concentration    intact  Ability to understand instructions Yes  Ability to respond meaningfully Yes  Language: [address] of knowledge/Intellect: Average  SI:   denies suicidal ideation  HI: Denies HI      Labs:     Admission on 01/28/2019, Discharged on 01/28/2019   Component Date Value    Ventricular Rate 01/28/2019 91     Atrial Rate 01/28/2019 91     P-R Interval 01/28/2019 168     QRS Duration 01/28/2019 80     Q-T Interval 01/28/2019 334     QTc Calculation (Bazett) 01/28/2019 410     P Axis 01/28/2019 34  R Axis 01/28/2019 29     T Axis 01/28/2019 12     Diagnosis 01/28/2019 Normal sinus rhythmNormal ECGNo previous ECGs availableConfirmed by [facility] JOSE MANUEL GRAFF, Katie Ulloa ([id number]) on 1/28/2019 5:12:09 PM    Orders Only on 01/24/2019   Component Date Value    Cholesterol, Total 01/24/2019 179     Triglycerides 01/24/2019 100     HDL 01/24/2019 47     LDL Calculated 01/24/2019 112*    VLDL Cholesterol Calcula* 01/24/2019 20     Glucose 01/24/2019 89        Last Drug screen:  Lab Results   Component Value Date    LABAMPH Neg 06/29/2018    LABBENZ Neg 06/29/2018    COCAIMETSCRU Neg 06/29/2018    LABMETH Neg 06/29/2018    OPIATESCREENURINE Neg 06/29/2018    PHENCYCLIDINESCREENURINE Neg 06/29/2018           Imaging: no head imaging on file        ASSESSMENT AND PLAN     Diagnosis Orders   1. PTSD (post-traumatic stress disorder)     2. EMA (generalized anxiety disorder)     3. Panic disorder without agoraphobia          1. Safety: NO Imminent risk of danger to/self/others based on the factors considered below. Appropriate for outpatient level of care. Safety plan includes: 911, PES, hotlines, and interventions discussed today. Risk factors: depressed mood, social isolation,  no outpatient services in place, and no collateral information to support safety.     Protective factors: Age >25 and <55, female gender,  denies  suicidal ideation, does not have lethal plan, does not have access to guns or weapons, patient is beatriz for safety, no prior suicide attempts, no substance abuse, no active psychosis or cognitive dysfunction, physically healthy, and patient is future oriented.        2. Psychiatric  - has tried/failed 3 ssri's     - doesn't want sedating medications. Anticipates would struggle to wake up next day. On weekends doesn't take armodofanil and sleeps all day. Considered seroquel. could help racing thoughts, mood and psychotic sx (feels like bugs crawling, has since improved).   Declined d/t potential sedation    - continue effexor xr 75mg/day for anxiety/mood/ptsd sx. Consider increase at next visit  - continue ativan 0.5mg prn, declined need for rf at present    - has used clonazepam in the past. Always sparingly    - never picked up hydroxyzine (was ordered prn nausea and sleep). - now agreeable to trial propanolol for anxiety. Was encouraged to try by therapist for physical sx of anxiety. Start 10mg bid-tid, titrate as tolerated    - could consider clonidine at hs for ptsd/anxiety    -Labs: reviewed in Epic, up to date    -Recommend continue outpt therapy. Sees B[facility] Hay[facility] at [facility].  RAÚL signed. He prefers email communication to set [email or web address]:  [phone]    - consider EMDR vs ptsd tx at [address] Would prefer mood and anxiety be under better control before initiating,     -OARRS reviewed, c/w history  -R/b/se/a d/w pt who consents.     3. Medical  -Following with Margrett Olszewski, MD     4. Substance   -No active issues.     5. RTC - 4 weeks  Previously completed short term disability 4/10/19 extending time off through 2 months or longer.         Katharine Corado, CNP  Psychiatric Nurse Practitioner

## 2019-04-19 DIAGNOSIS — G47.11 IDIOPATHIC HYPERSOMNOLENCE: ICD-10-CM

## 2019-04-22 RX ORDER — ARMODAFINIL 250 MG/1
TABLET ORAL
Qty: 30 TABLET | Refills: 5 | Status: SHIPPED | OUTPATIENT
Start: 2019-04-22 | End: 2019-10-26 | Stop reason: SDUPTHER

## 2019-05-20 ENCOUNTER — OFFICE VISIT (OUTPATIENT)
Dept: PSYCHIATRY | Age: 34
End: 2019-05-20
Payer: COMMERCIAL

## 2019-05-20 VITALS
BODY MASS INDEX: 33.8 KG/M2 | SYSTOLIC BLOOD PRESSURE: 104 MMHG | WEIGHT: 198 LBS | HEART RATE: 80 BPM | HEIGHT: 64 IN | DIASTOLIC BLOOD PRESSURE: 74 MMHG

## 2019-05-20 DIAGNOSIS — F41.1 GAD (GENERALIZED ANXIETY DISORDER): ICD-10-CM

## 2019-05-20 DIAGNOSIS — F41.0 PANIC DISORDER WITHOUT AGORAPHOBIA: ICD-10-CM

## 2019-05-20 DIAGNOSIS — F43.10 PTSD (POST-TRAUMATIC STRESS DISORDER): Primary | ICD-10-CM

## 2019-05-20 PROCEDURE — 99214 OFFICE O/P EST MOD 30 MIN: CPT | Performed by: NURSE PRACTITIONER

## 2019-05-20 ASSESSMENT — PATIENT HEALTH QUESTIONNAIRE - PHQ9
10. IF YOU CHECKED OFF ANY PROBLEMS, HOW DIFFICULT HAVE THESE PROBLEMS MADE IT FOR YOU TO DO YOUR WORK, TAKE CARE OF THINGS AT HOME, OR GET ALONG WITH OTHER PEOPLE: 3
SUM OF ALL RESPONSES TO PHQ QUESTIONS 1-9: 17
6. FEELING BAD ABOUT YOURSELF - OR THAT YOU ARE A FAILURE OR HAVE LET YOURSELF OR YOUR FAMILY DOWN: 2
SUM OF ALL RESPONSES TO PHQ9 QUESTIONS 1 & 2: 5
4. FEELING TIRED OR HAVING LITTLE ENERGY: 2
2. FEELING DOWN, DEPRESSED OR HOPELESS: 2
1. LITTLE INTEREST OR PLEASURE IN DOING THINGS: 3
5. POOR APPETITE OR OVEREATING: 2
7. TROUBLE CONCENTRATING ON THINGS, SUCH AS READING THE NEWSPAPER OR WATCHING TELEVISION: 1
9. THOUGHTS THAT YOU WOULD BE BETTER OFF DEAD, OR OF HURTING YOURSELF: 2
8. MOVING OR SPEAKING SO SLOWLY THAT OTHER PEOPLE COULD HAVE NOTICED. OR THE OPPOSITE, BEING SO FIGETY OR RESTLESS THAT YOU HAVE BEEN MOVING AROUND A LOT MORE THAN USUAL: 1
SUM OF ALL RESPONSES TO PHQ QUESTIONS 1-9: 17
3. TROUBLE FALLING OR STAYING ASLEEP: 2

## 2019-05-20 ASSESSMENT — ANXIETY QUESTIONNAIRES
2. NOT BEING ABLE TO STOP OR CONTROL WORRYING: 2-OVER HALF THE DAYS
6. BECOMING EASILY ANNOYED OR IRRITABLE: 3-NEARLY EVERY DAY
7. FEELING AFRAID AS IF SOMETHING AWFUL MIGHT HAPPEN: 3-NEARLY EVERY DAY
1. FEELING NERVOUS, ANXIOUS, OR ON EDGE: 2-OVER HALF THE DAYS
GAD7 TOTAL SCORE: 15
4. TROUBLE RELAXING: 2-OVER HALF THE DAYS
5. BEING SO RESTLESS THAT IT IS HARD TO SIT STILL: 1-SEVERAL DAYS
3. WORRYING TOO MUCH ABOUT DIFFERENT THINGS: 2-OVER HALF THE DAYS

## 2019-05-20 NOTE — PROGRESS NOTES
PSYCHIATRY PROGRESS NOTE    Grazyna Sylvester  1985 5/20/19    Face to Face time: 60 mins  CC:   Chief Complaint   Patient presents with    Follow-up     Per excerpt of initial eval with this provider 11/2018:    Edda Phlegm  \"I'm a mess\". 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 Says \"I know it's bad when i'm referred by dr Kayla Sheikh. Told me \"I know you're fighting a lot more than youre letting on. Don't want to lose you. \"      Longstanding h/o anxiety. Can recall not wanting to sharpen pencil in  because feared people would watch her so sat in seat and cried because was terrified to move. First panic attack at 8th grade science fair. From then on, knew couldn't be in middle of gym. Struggles in crowds, can't be in middle, has to be in open space away from others. In school would eat lunch with counselor for part of middle school, half of sabino year, and all of senior year d/t severe anxiety. Is challenge because son is social butterfly. Hard for me because I feel like I hold him back. Terrifies me to do things. Don't want to hold him back      Routine driven. Struggles with change. Likes to know what's anticipated of her. Recent issues with work, unpredictable with schedule. Feels guilt for leaving son at  if has to stay late.       Describes panic attacks as chest \"locking up\", struggles to breathe.       Had episode in college was one minute late to class. As soon as walked in felt everyone's eyes on her, staring at her. Passed out. Woke up to professor and people standing over her. Now if late will not enter class, would sit outside of class and take notes but would refuse to walk in late. If 5 people on elevator, won't get on elevator or will exit and take stairs. Lived on 11th floor of dorm, would take stairs often     Everyday, always anxious. Feels like bottle of pop been shaken up, feels like ready to explode. Have to psych myself up to do anything.   Always panic and ptsd  who p/t clinic for follow up. Since last visit 4/15/19, has been \"little bit everything\". Friday had field day at son's school. Final project for PTA. They saw worst side of me, made people cry. They voted me back in as . Told them doesn't want to do. Was feeling more motivated until 3 weeks ago when had multiple stressors. Doesn't believe can return to former job. Received call from former coworker. Admits now sees was being bullied and not supported by management, didn't realize until she left the job as well. Intense anxiety to even think about former workplace. Can't picture self being able to manage being back in that environment    Still hasn't gotten paycheck from short term disability. They dropped the ball majorly. Didn't start claim until 2nd week April. Living through this [financial stress] is less stressful than being at that job everyday. 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 Had been helping friend's daughter during season. Loves dance. Didn't have formal training but would like to pursue. Danced 12 years. Considered applying at [employer]. Will be babysitting over summer (son's friends)      Taking effexor 75mg daily. In am.  Denies se's. Can tell a difference. Still gets really anxious. Therapist calls them something, not the \"big ones\" [panic attacks] that are brief. Are extended feelings of being on edge, can last hours or all day. Taking propanolol once daily in am.  Anymore than that \"I bottom out\". If gets to point has anxiety or panic, \"I bottom pretty bad. \"  Gets lightheaded. Things start spinning. Gets dizzy, nauseated. pulse can drop to 70.  bp can got as high as 130/90, Some days heart racing, others not. Cp on highest anxiety days.     Triggers primarily centered around work; also some with son's father's family    Recently collapsed in driveway after learning someone at [employer] brought loaded gun to school. Still seeing therapist weekly. Jesse Fletcher, Psych [facility]. Working on finding more anchors. Substance: Tobacco:  <1ppd cigarettes, goes in waves depending on how feeling   Etoh: occ, rare    Illicts:  Denies   Caffeine:  Reduced to 1-1.5 cups was 3-4 cups coffee/day    Grandmother  last May. Her memorial on [employer] day. Psych ROS:      Depression: Agitated last few weeks. Thinks mostly d/t bill collectors, things being turned off. Electric soon will be. Hasn't paid landlord. She paid water bill. Phone was off for while. Mom and dad helped get some things paid. Rates 3/10 (10 best). Sleep \"back and forth\" doesn't sleep through night but son wakes her during night often. Can sleep during day even with armodofinil. Racing thoughts constant. Energy still low during day. Most days remains isolative. Drops off/picks up son daily from school. Taking him to karate 2-3 times/week. Hasn't been to gym lately. Did start trying [employer] for extra money. Had cancelled multiple shifts. First successful shift when had son with her. Hasn't been to grocery for self \"don't have money to do it\"  Longstanding \"introvert\". Stays busy with son Sachi Mcdowell who's very active and social.    Constantly busy with things at home but feels nothing getting done. Appetite low, food doesn't sound good. Little better with adls since now has hot water. ogoing hopelessness and helplessness. Ongoing tearfulness, daily    SI x 1 last week when learned someone at son's school brought loaded gun to school. Was \"what if\" should something bad happen to son. \"he's my life, my reason to live\"  denies plan or intent.   Denies HI     Ana: DENIES insomnia with increased energy, rapid speech, easily distracted or decreased attention, irritability, racing thoughts, expansive mood, increase in energy and goal directed behavior, grandiosity, flight of ideas     Anxiety: rates 7/10, constant worry, generalized, increased irritability, sleep disruption, somatic complaints (headaches, trembling, nausea, dyspnea, diaphoreis, muscle tension, dizzy/lightheaded), restlessness, fatigue; fear of doing/saying wrong things, fear of judgement, avoidant of social situations    Constant, most days rates 8/10 (10 worst),    Triggered in crowds. Goes to less busy stores. Some nausea.  + HA's, intermittent. Often sits in car in driveway after getting home to decompress from anxiety and stress of day. Doesn't want son to see how she's truly feeling     OCD:  need for symmetry and organization, will increase during times of high stress; has 2 of things; DENIES Repetitive actions or rituals, excessive hand washing, contamination fears, , violent thoughts, hoarding, fear of being harmed, mental or verbal repetition of words or phrases and counting     Panic:   Intermittent episodes, has had approx 3 times this month where had to escape situation. Has episodes of sustained high anxiety weekly. Were every time left therapist office. [place] couldn't be around anyone.   Historically lasts until gets out of situation; has passed out in past; Shortness of breath, dizziness, diaphoresis, trembling, palpitations, feeing of choking, nausea, feeling outside of self, numbness, chills, hot flashes, chest discomfort and fear of dying    Psychosis: if panicky/anxious state, episodes feels like things crawling on her, feels like something on bed; other denies A/VH, paranoia, delusions     ADHD:  denies     PTSD: nightmares (mostly about son, interactions with people)  Rare flashbacks, hypervigilance, easily startled, decreased sleep, reliving the event, avoiding situations that remind you of trauma, physical and mental paralysis when reminded of the experience, same despair, easily angry or irritable, trouble concentrating, fear for safety,  Numbness of emotions, feeling of detachment; dissociative episodes     Eating disorders: DENIES RECENTsome binge eating over past 1-2 years. If frustrated or upset will hit several fast food places; infrequent; + guilt; uses as way to control situation she otherwise feels no control over       EMA 7 SCORE 5/20/2019 4/15/2019 3/7/2019 2/4/2019 1/2/2019 12/19/2018 11/21/2018   EMA-7 Total Score 15 15 15 19 12 14 11     Interpretation of EMA-7 score: 5-9 = mild anxiety, 10-14 = moderate anxiety,   15+ = severe anxiety. Recommend referral to behavioral health for scores 10 or greater. PHQ-9 Total Score: 17 (5/20/2019  1:40 PM)  PHQ-9 Total Score: 13 (4/15/2019 10:43 AM)  PHQ-9 Total Score: 17 (3/7/2019 11:02 AM)  PHQ-9 Total Score: 25 (2/4/2019 10:14 AM)  PHQ-9 Total Score: 17 (1/2/2019 11:45 AM)   PHQ-9 Total Score: 20 (12/19/2018  1:04 PM)  PHQ-9 Total Score: 20 (11/21/2018  1:07 PM)   PHQ-9 Total Score: 21 (11/7/2018  9:05 AM)  Interpretation of PHQ-9 score:  1-4 = minimal depression, 5-9 = mild depression,   10-14 = moderate depression; 15-19 = moderately severe depression, 20-27 = severe depression      Past Psychiatric History:               Prior hospitalizations: denies              Prior diagnoses:  EMA, panic d/o, ptsd without agoraphobia              Outpatient Treatment:                           Psychiatrist: russell                          Therapist: todd frost; some school therapists in past              Suicide Attempts:  denies              Hx SH:  denies     Past Psychopharmacologic Trials (including response/reactions):  1. Lexapro:  Stopped 11/2017, was tired all time, couldn't function; started running (was racing), felt dulled, was on 20mg/day  2. Clonazepam:  Still technically on this, has in case of \"shut down moment\". Will pass out if gets too much.   Haven't been to that state since college but has come close to it, 1-2 months ago at work, [employer] reign it in, couldn't breathe, usually good at hiding it but everyone knew I wasn't functioning right. Gets tunnel vision, depth perception gets off  3. Viibryd:  Nausea  4. Prozac:  Muscle pain  5. Chantix:  Ha's, agitation, impaired sleep        Past Medical/Surgical History:   Past Medical History:   Diagnosis Date    Cervical incompetence     Tobacco dependence      Past Surgical History:   Procedure Laterality Date    APPENDECTOMY  2005       Family History   Problem Relation Age of Onset    Breast Cancer Maternal Aunt 39    Cancer Maternal Grandmother         brain    Alzheimer's Disease Maternal Grandmother     Cancer Paternal Grandmother         lymphoma    High Blood Pressure Paternal Grandmother     Stroke Other          PCP: Chetna Duval MD      Allergies: Allergies   Allergen Reactions    Vicodin [Hydrocodone-Acetaminophen] Shortness Of Breath, Palpitations and Other (See Comments)     dizzyness         Current Medications:   Current Outpatient Medications on File Prior to Visit   Medication Sig Dispense Refill    Armodafinil 250 MG TABS One tab QAM prn 30 tablet 5    propranolol (INDERAL) 10 MG tablet Take 1 tablet by mouth 3 times daily 90 tablet 3    venlafaxine (EFFEXOR XR) 75 MG extended release capsule Take 1 capsule by mouth daily 30 capsule 3    Multiple Vitamin (MULTI VITAMIN DAILY PO) Take by mouth      clonazePAM (KLONOPIN) 0.5 MG tablet Take 1 tablet by mouth daily as needed for Anxiety for up to 30 days. . 30 tablet 0    meloxicam (MOBIC) 15 MG tablet Take 1 tablet by mouth daily Take one tab 15 mg by mouth daily with food 30 tablet 1     No current facility-administered medications on file prior to visit.           Controlled substances monitoring: possible medication side effects, risk of tolerance and/or dependence, and alternative treatments discussed   04/22/2019  1   04/22/2019  Armodafinil 250 MG Tablet  30 30 Ba Loraine  [id number]   Tammy ([id number])  0  Comm Ins  [place]   02/04/2019  1   02/04/2019  Lorazepam 0.5 MG Tablet  28 14 Ch Wet  [id number]   [facility] Anxious  Depressed  Low self-esteem  Shame  Humiliation  Demoralization  Affect    anxiety Congruent to thought content and mood  Thought Content    excessive guilt, excessive preoccupations, intrusive thoughts, paranoid thoughts, cognitive distortions and all or nothing thinking, no delusions voiced  Thought Process    coherent   Associations    logical connections  Perceptions: denies AH/VH, does not appear preoccupied with the internal environment  Insight    Good  Judgment    Intact  Orientation    oriented to person, place, time, and general circumstances  Memory    recent and remote memory intact  Attention/Concentration    intact  Ability to understand instructions Yes  Ability to respond meaningfully Yes  Language: [address] knowledge/Intellect: Average  SI:   denies suicidal ideation  HI: Denies HI      Labs:     Admission on 01/28/2019, Discharged on 01/28/2019   Component Date Value    Ventricular Rate 01/28/2019 91     Atrial Rate 01/28/2019 91     P-R Interval 01/28/2019 168     QRS Duration 01/28/2019 80     Q-T Interval 01/28/2019 334     QTc Calculation (Bazett) 01/28/2019 410     P Axis 01/28/2019 34     R Axis 01/28/2019 29     T Axis 01/28/2019 12     Diagnosis 01/28/2019 Normal sinus rhythmNormal ECGNo previous ECGs availableConfirmed by Cristela Borja MD, Marco Kuo ([id number]) on 1/28/2019 5:12:09 PM    Orders Only on 01/24/2019   Component Date Value    Cholesterol, Total 01/24/2019 179     Triglycerides 01/24/2019 100     HDL 01/24/2019 47     LDL Calculated 01/24/2019 112*    VLDL Cholesterol Calcula* 01/24/2019 20     Glucose 01/24/2019 89        Last Drug screen:  Lab Results   Component Value Date    LABAMPH Neg 06/29/2018    LABBENZ Neg 06/29/2018    COCAIMETSCRU Neg 06/29/2018    LABMETH Neg 06/29/2018    OPIATESCREENURINE Neg 06/29/2018    PHENCYCLIDINESCREENURINE Neg 06/29/2018           Imaging: no head imaging on file        ASSESSMENT AND PLAN     Diagnosis Orders   1.  PTSD (post-traumatic stress disorder)     2. EMA (generalized anxiety disorder)     3. Panic disorder without agoraphobia          1. Safety: NO Imminent risk of danger to/self/others based on the factors considered below. Appropriate for outpatient level of care. Safety plan includes: 911, PES, hotlines, and interventions discussed today. Risk factors: depressed mood, social isolation,  no outpatient services in place, and no collateral information to support safety.     Protective factors: Age >25 and <55, female gender,  denies  suicidal ideation, does not have lethal plan, does not have access to guns or weapons, patient is beatriz for safety, no prior suicide attempts, no substance abuse, no active psychosis or cognitive dysfunction, physically healthy, and patient is future oriented.        2. Psychiatric  - has tried/failed 3 ssri's     - for most part feels medicine helping. Last 3 weeks awful in general d/t multiple stressors (finances, son's threat at school). Can tell different with adding propanolol but can't tolerate more than one dose/day    - doesn't want sedating medications. Anticipates would struggle to wake up next day. On weekends doesn't take armodofanil and sleeps all day. Considered seroquel. could help racing thoughts, mood and psychotic sx (feels like bugs crawling, has since improved). Declined d/t potential sedation    - continue effexor xr 75mg/day for anxiety/mood/ptsd sx. Consider increase at next visit     - Continue clonazepam 0.5mg prn. Always sparingly    - never picked up hydroxyzine (was ordered prn nausea and sleep). - continue propanolol for anxiety. Only taking daily. Doesn't feel can tolerate more frequently    - could consider clonidine at  for ptsd/anxiety    -Labs: reviewed in Epic, up to date    -Recommend continue outpt therapy. Sees Ashok Canseco at [facility].  RAÚL signed.   He prefers email communication to set up phone consultation: [email or web address]   [phone]    - consider EMDR vs ptsd tx at [address] Would prefer mood and anxiety be under better control before initiating,     -OARRS reviewed, c/w history  -R/b/se/a d/w pt who consents.     3. Medical  -Following with Erika Sheffield MD     4. Substance   -No active issues.     5. RTC - 4 weeks  First available 7/8 1pm.  Previously completed short term disability 4/10/19 extending time off through 2 months or longer.         Lemuel Hampton CNP  Psychiatric Nurse Practitioner

## 2019-07-15 ENCOUNTER — OFFICE VISIT (OUTPATIENT)
Dept: PSYCHIATRY | Age: 34
End: 2019-07-15

## 2019-07-15 VITALS
HEIGHT: 64 IN | WEIGHT: 189.6 LBS | BODY MASS INDEX: 32.37 KG/M2 | DIASTOLIC BLOOD PRESSURE: 84 MMHG | SYSTOLIC BLOOD PRESSURE: 130 MMHG

## 2019-07-15 DIAGNOSIS — F41.0 PANIC DISORDER WITHOUT AGORAPHOBIA: ICD-10-CM

## 2019-07-15 DIAGNOSIS — F43.10 PTSD (POST-TRAUMATIC STRESS DISORDER): ICD-10-CM

## 2019-07-15 DIAGNOSIS — F41.1 GAD (GENERALIZED ANXIETY DISORDER): Primary | ICD-10-CM

## 2019-07-15 PROCEDURE — 99214 OFFICE O/P EST MOD 30 MIN: CPT | Performed by: NURSE PRACTITIONER

## 2019-07-15 RX ORDER — LORAZEPAM 0.5 MG/1
0.5 TABLET ORAL 2 TIMES DAILY PRN
Qty: 60 TABLET | Refills: 0 | Status: SHIPPED | OUTPATIENT
Start: 2019-07-15 | End: 2019-08-14

## 2019-07-15 ASSESSMENT — PATIENT HEALTH QUESTIONNAIRE - PHQ9
SUM OF ALL RESPONSES TO PHQ QUESTIONS 1-9: 24
9. THOUGHTS THAT YOU WOULD BE BETTER OFF DEAD, OR OF HURTING YOURSELF: 2
7. TROUBLE CONCENTRATING ON THINGS, SUCH AS READING THE NEWSPAPER OR WATCHING TELEVISION: 3
8. MOVING OR SPEAKING SO SLOWLY THAT OTHER PEOPLE COULD HAVE NOTICED. OR THE OPPOSITE, BEING SO FIGETY OR RESTLESS THAT YOU HAVE BEEN MOVING AROUND A LOT MORE THAN USUAL: 2
6. FEELING BAD ABOUT YOURSELF - OR THAT YOU ARE A FAILURE OR HAVE LET YOURSELF OR YOUR FAMILY DOWN: 2
3. TROUBLE FALLING OR STAYING ASLEEP: 3
4. FEELING TIRED OR HAVING LITTLE ENERGY: 3
1. LITTLE INTEREST OR PLEASURE IN DOING THINGS: 3
SUM OF ALL RESPONSES TO PHQ9 QUESTIONS 1 & 2: 6
2. FEELING DOWN, DEPRESSED OR HOPELESS: 3
5. POOR APPETITE OR OVEREATING: 3
10. IF YOU CHECKED OFF ANY PROBLEMS, HOW DIFFICULT HAVE THESE PROBLEMS MADE IT FOR YOU TO DO YOUR WORK, TAKE CARE OF THINGS AT HOME, OR GET ALONG WITH OTHER PEOPLE: 2
SUM OF ALL RESPONSES TO PHQ QUESTIONS 1-9: 24

## 2019-07-15 ASSESSMENT — ANXIETY QUESTIONNAIRES
GAD7 TOTAL SCORE: 18
1. FEELING NERVOUS, ANXIOUS, OR ON EDGE: 3-NEARLY EVERY DAY
2. NOT BEING ABLE TO STOP OR CONTROL WORRYING: 3-NEARLY EVERY DAY
3. WORRYING TOO MUCH ABOUT DIFFERENT THINGS: 3-NEARLY EVERY DAY
5. BEING SO RESTLESS THAT IT IS HARD TO SIT STILL: 0-NOT AT ALL SURE
7. FEELING AFRAID AS IF SOMETHING AWFUL MIGHT HAPPEN: 3-NEARLY EVERY DAY
6. BECOMING EASILY ANNOYED OR IRRITABLE: 3-NEARLY EVERY DAY
4. TROUBLE RELAXING: 3-NEARLY EVERY DAY

## 2019-07-15 NOTE — PROGRESS NOTES
PSYCHIATRY PROGRESS NOTE    Nazia Garcia  1985 5/20/19    Face to Face time: 60 mins  CC:   Chief Complaint   Patient presents with    Follow-up     Per excerpt of initial eval with this provider 11/2018:    Sherie Bustos  \"I'm a mess\". 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 Says \"I know it's bad when i'm referred by dr Sammy Kramer. Told me \"I know you're fighting a lot more than youre letting on. Don't want to lose you. \"      Longstanding h/o anxiety. Can recall not wanting to sharpen pencil in  because feared people would watch her so sat in seat and cried because was terrified to move. First panic attack at 8th grade science fair. From then on, knew couldn't be in middle of gym. Struggles in crowds, can't be in middle, has to be in open space away from others. In school would eat lunch with counselor for part of middle school, half of sabino year, and all of senior year d/t severe anxiety. Is challenge because son is social butterfly. Hard for me because I feel like I hold him back. Terrifies me to do things. Don't want to hold him back      Routine driven. Struggles with change. Likes to know what's anticipated of her. Recent issues with work, unpredictable with schedule. Feels guilt for leaving son at  if has to stay late.       Describes panic attacks as chest \"locking up\", struggles to breathe.       Had episode in college was one minute late to class. As soon as walked in felt everyone's eyes on her, staring at her. Passed out. Woke up to professor and people standing over her. Now if late will not enter class, would sit outside of class and take notes but would refuse to walk in late. If 5 people on elevator, won't get on elevator or will exit and take stairs. Lived on 11th floor of dorm, would take stairs often     Everyday, always anxious. Feels like bottle of pop been shaken up, feels like ready to explode. Have to psych myself up to do anything.   Always have an escape plan. Will use any viable excuse to leave social events so doesn't have to stay too long     Had several stressors over past couple years. 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 Some days can maintain happy persona. Other days don't want to be talked to. Some days feel like screaming. Feel bad for my co- workers. Put all my effort into patients. Not fair to them. Not very nice at times.       Dx sleep disorder, 6/2018. Rx armodofinil past few months. Now staying awake. Energy remains low. Now that is more awake and aware, anxiety more prevalent     Had seen tracy frost for therapy in past.  [facility]. Was going to do emdr for ptsd, but couldn't start d/t financial constraints.       Son's dad sees him every other weeknd. Is remarried, has another child. Now the father wants to spend more time with son. His wife has to have things done certain way, pushy. Pt has an agreement in place with dad, then things get changed because she (ex's wife) wants it certain way. Pt feels helpeless, feels like don't have a choice because would hurt her son if didn't allow to see his father. Fears one day they will say they want full custody, feels is heading that direction. Thinks they would say she doesn't take him anywhere because of anxiety. Struggled with idea of seeking treatment, doesn't want it held against her but motivated for treatment because doesn't want to hold him back from doing things. Work becomes trigger, don't leave on time. Son at . Has discussed with manager.   Feels guilt for being away from son        HPI:   Nazia Garcia is a 35 y.o. female with h/o anxiety,

## 2019-08-29 ENCOUNTER — OFFICE VISIT (OUTPATIENT)
Dept: PSYCHIATRY | Age: 34
End: 2019-08-29

## 2019-08-29 VITALS
SYSTOLIC BLOOD PRESSURE: 108 MMHG | BODY MASS INDEX: 31.76 KG/M2 | HEIGHT: 64 IN | WEIGHT: 186 LBS | DIASTOLIC BLOOD PRESSURE: 70 MMHG | HEART RATE: 74 BPM

## 2019-08-29 DIAGNOSIS — F41.0 PANIC DISORDER WITHOUT AGORAPHOBIA: ICD-10-CM

## 2019-08-29 DIAGNOSIS — F43.10 PTSD (POST-TRAUMATIC STRESS DISORDER): ICD-10-CM

## 2019-08-29 DIAGNOSIS — F41.1 GAD (GENERALIZED ANXIETY DISORDER): Primary | ICD-10-CM

## 2019-08-29 PROCEDURE — 99214 OFFICE O/P EST MOD 30 MIN: CPT | Performed by: NURSE PRACTITIONER

## 2019-08-29 ASSESSMENT — PATIENT HEALTH QUESTIONNAIRE - PHQ9
10. IF YOU CHECKED OFF ANY PROBLEMS, HOW DIFFICULT HAVE THESE PROBLEMS MADE IT FOR YOU TO DO YOUR WORK, TAKE CARE OF THINGS AT HOME, OR GET ALONG WITH OTHER PEOPLE: 1
3. TROUBLE FALLING OR STAYING ASLEEP: 3
SUM OF ALL RESPONSES TO PHQ QUESTIONS 1-9: 10
1. LITTLE INTEREST OR PLEASURE IN DOING THINGS: 1
9. THOUGHTS THAT YOU WOULD BE BETTER OFF DEAD, OR OF HURTING YOURSELF: 0
7. TROUBLE CONCENTRATING ON THINGS, SUCH AS READING THE NEWSPAPER OR WATCHING TELEVISION: 1
6. FEELING BAD ABOUT YOURSELF - OR THAT YOU ARE A FAILURE OR HAVE LET YOURSELF OR YOUR FAMILY DOWN: 1
2. FEELING DOWN, DEPRESSED OR HOPELESS: 1
SUM OF ALL RESPONSES TO PHQ9 QUESTIONS 1 & 2: 2
5. POOR APPETITE OR OVEREATING: 2
SUM OF ALL RESPONSES TO PHQ QUESTIONS 1-9: 10
8. MOVING OR SPEAKING SO SLOWLY THAT OTHER PEOPLE COULD HAVE NOTICED. OR THE OPPOSITE, BEING SO FIGETY OR RESTLESS THAT YOU HAVE BEEN MOVING AROUND A LOT MORE THAN USUAL: 0
4. FEELING TIRED OR HAVING LITTLE ENERGY: 1

## 2019-08-29 ASSESSMENT — ANXIETY QUESTIONNAIRES
6. BECOMING EASILY ANNOYED OR IRRITABLE: 2-OVER HALF THE DAYS
1. FEELING NERVOUS, ANXIOUS, OR ON EDGE: 2-OVER HALF THE DAYS
5. BEING SO RESTLESS THAT IT IS HARD TO SIT STILL: 1-SEVERAL DAYS
GAD7 TOTAL SCORE: 8
7. FEELING AFRAID AS IF SOMETHING AWFUL MIGHT HAPPEN: 1-SEVERAL DAYS
2. NOT BEING ABLE TO STOP OR CONTROL WORRYING: 0-NOT AT ALL SURE
4. TROUBLE RELAXING: 1-SEVERAL DAYS
3. WORRYING TOO MUCH ABOUT DIFFERENT THINGS: 1-SEVERAL DAYS

## 2019-08-29 NOTE — PROGRESS NOTES
have an escape plan. Will use any viable excuse to leave social events so doesn't have to stay too long     Had several stressors over past couple years. 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 Some days can maintain happy persona. Other days don't want to be talked to. Some days feel like screaming. Feel bad for my co- workers. Put all my effort into patients. Not fair to them. Not very nice at times.       Dx sleep disorder, 6/2018. Rx armodofinil past few months. Now staying awake. Energy remains low. Now that is more awake and aware, anxiety more prevalent     Had seen tracy frost for therapy in past.  [facility]. Was going to do emdr for ptsd, but couldn't start d/t financial constraints.       Son's dad sees him every other weeknd. Is remarried, has another child. Now the father wants to spend more time with son. His wife has to have things done certain way, pushy. Pt has an agreement in place with dad, then things get changed because she (ex's wife) wants it certain way. Pt feels helpeless, feels like don't have a choice because would hurt her son if didn't allow to see his father. Fears one day they will say they want full custody, feels is heading that direction. Thinks they would say she doesn't take him anywhere because of anxiety. Struggled with idea of seeking treatment, doesn't want it held against her but motivated for treatment because doesn't want to hold him back from doing things. Work becomes trigger, don't leave on time. Son at . Has discussed with manager.   Feels guilt for being away from son        HPI:   Jennifer De Los Santos is a 35 y.o. female with h/o anxiety,

## 2019-10-21 ENCOUNTER — OFFICE VISIT (OUTPATIENT)
Dept: PSYCHIATRY | Age: 34
End: 2019-10-21

## 2019-10-21 VITALS
BODY MASS INDEX: 31 KG/M2 | SYSTOLIC BLOOD PRESSURE: 110 MMHG | WEIGHT: 181.6 LBS | HEART RATE: 60 BPM | DIASTOLIC BLOOD PRESSURE: 70 MMHG | HEIGHT: 64 IN

## 2019-10-21 DIAGNOSIS — F43.10 PTSD (POST-TRAUMATIC STRESS DISORDER): ICD-10-CM

## 2019-10-21 DIAGNOSIS — F41.1 GAD (GENERALIZED ANXIETY DISORDER): Primary | ICD-10-CM

## 2019-10-21 DIAGNOSIS — F41.0 PANIC DISORDER WITHOUT AGORAPHOBIA: ICD-10-CM

## 2019-10-21 PROCEDURE — 99214 OFFICE O/P EST MOD 30 MIN: CPT | Performed by: NURSE PRACTITIONER

## 2019-10-21 ASSESSMENT — ANXIETY QUESTIONNAIRES
7. FEELING AFRAID AS IF SOMETHING AWFUL MIGHT HAPPEN: 2-OVER HALF THE DAYS
5. BEING SO RESTLESS THAT IT IS HARD TO SIT STILL: 1-SEVERAL DAYS
2. NOT BEING ABLE TO STOP OR CONTROL WORRYING: 3-NEARLY EVERY DAY
4. TROUBLE RELAXING: 2-OVER HALF THE DAYS
GAD7 TOTAL SCORE: 17
1. FEELING NERVOUS, ANXIOUS, OR ON EDGE: 3-NEARLY EVERY DAY
6. BECOMING EASILY ANNOYED OR IRRITABLE: 3-NEARLY EVERY DAY
3. WORRYING TOO MUCH ABOUT DIFFERENT THINGS: 3-NEARLY EVERY DAY

## 2019-10-21 ASSESSMENT — PATIENT HEALTH QUESTIONNAIRE - PHQ9
4. FEELING TIRED OR HAVING LITTLE ENERGY: 3
1. LITTLE INTEREST OR PLEASURE IN DOING THINGS: 2
SUM OF ALL RESPONSES TO PHQ QUESTIONS 1-9: 17
8. MOVING OR SPEAKING SO SLOWLY THAT OTHER PEOPLE COULD HAVE NOTICED. OR THE OPPOSITE, BEING SO FIGETY OR RESTLESS THAT YOU HAVE BEEN MOVING AROUND A LOT MORE THAN USUAL: 2
9. THOUGHTS THAT YOU WOULD BE BETTER OFF DEAD, OR OF HURTING YOURSELF: 0
7. TROUBLE CONCENTRATING ON THINGS, SUCH AS READING THE NEWSPAPER OR WATCHING TELEVISION: 2
SUM OF ALL RESPONSES TO PHQ9 QUESTIONS 1 & 2: 3
10. IF YOU CHECKED OFF ANY PROBLEMS, HOW DIFFICULT HAVE THESE PROBLEMS MADE IT FOR YOU TO DO YOUR WORK, TAKE CARE OF THINGS AT HOME, OR GET ALONG WITH OTHER PEOPLE: 1
2. FEELING DOWN, DEPRESSED OR HOPELESS: 1
6. FEELING BAD ABOUT YOURSELF - OR THAT YOU ARE A FAILURE OR HAVE LET YOURSELF OR YOUR FAMILY DOWN: 2
5. POOR APPETITE OR OVEREATING: 2
SUM OF ALL RESPONSES TO PHQ QUESTIONS 1-9: 17
3. TROUBLE FALLING OR STAYING ASLEEP: 3

## 2019-10-26 DIAGNOSIS — G47.11 IDIOPATHIC HYPERSOMNOLENCE: ICD-10-CM

## 2019-10-28 RX ORDER — ARMODAFINIL 250 MG/1
TABLET ORAL
Qty: 30 TABLET | Refills: 5 | Status: SHIPPED | OUTPATIENT
Start: 2019-10-28 | End: 2020-05-13

## 2019-12-19 ENCOUNTER — OFFICE VISIT (OUTPATIENT)
Dept: PSYCHIATRY | Age: 34
End: 2019-12-19

## 2019-12-19 VITALS
HEIGHT: 64 IN | WEIGHT: 177 LBS | BODY MASS INDEX: 30.22 KG/M2 | SYSTOLIC BLOOD PRESSURE: 130 MMHG | HEART RATE: 80 BPM | DIASTOLIC BLOOD PRESSURE: 78 MMHG

## 2019-12-19 DIAGNOSIS — F41.0 PANIC DISORDER WITHOUT AGORAPHOBIA: ICD-10-CM

## 2019-12-19 DIAGNOSIS — F41.1 GAD (GENERALIZED ANXIETY DISORDER): Primary | ICD-10-CM

## 2019-12-19 DIAGNOSIS — F43.10 PTSD (POST-TRAUMATIC STRESS DISORDER): ICD-10-CM

## 2019-12-19 PROCEDURE — 99213 OFFICE O/P EST LOW 20 MIN: CPT | Performed by: NURSE PRACTITIONER

## 2019-12-19 ASSESSMENT — COLUMBIA-SUICIDE SEVERITY RATING SCALE - C-SSRS
1. WITHIN THE PAST MONTH, HAVE YOU WISHED YOU WERE DEAD OR WISHED YOU COULD GO TO SLEEP AND NOT WAKE UP?: YES
5. HAVE YOU STARTED TO WORK OUT OR WORKED OUT THE DETAILS OF HOW TO KILL YOURSELF? DO YOU INTEND TO CARRY OUT THIS PLAN?: NO
6. HAVE YOU EVER DONE ANYTHING, STARTED TO DO ANYTHING, OR PREPARED TO DO ANYTHING TO END YOUR LIFE?: NO
3. HAVE YOU BEEN THINKING ABOUT HOW YOU MIGHT KILL YOURSELF?: YES
2. HAVE YOU ACTUALLY HAD ANY THOUGHTS OF KILLING YOURSELF?: YES
4. HAVE YOU HAD THESE THOUGHTS AND HAD SOME INTENTION OF ACTING ON THEM?: YES

## 2019-12-19 ASSESSMENT — ANXIETY QUESTIONNAIRES
3. WORRYING TOO MUCH ABOUT DIFFERENT THINGS: 3-NEARLY EVERY DAY
7. FEELING AFRAID AS IF SOMETHING AWFUL MIGHT HAPPEN: 2-OVER HALF THE DAYS
1. FEELING NERVOUS, ANXIOUS, OR ON EDGE: 2-OVER HALF THE DAYS
2. NOT BEING ABLE TO STOP OR CONTROL WORRYING: 2-OVER HALF THE DAYS
4. TROUBLE RELAXING: 2-OVER HALF THE DAYS
GAD7 TOTAL SCORE: 13
5. BEING SO RESTLESS THAT IT IS HARD TO SIT STILL: 1-SEVERAL DAYS
6. BECOMING EASILY ANNOYED OR IRRITABLE: 1-SEVERAL DAYS

## 2019-12-19 ASSESSMENT — PATIENT HEALTH QUESTIONNAIRE - PHQ9
3. TROUBLE FALLING OR STAYING ASLEEP: 3
2. FEELING DOWN, DEPRESSED OR HOPELESS: 2
SUM OF ALL RESPONSES TO PHQ QUESTIONS 1-9: 22
7. TROUBLE CONCENTRATING ON THINGS, SUCH AS READING THE NEWSPAPER OR WATCHING TELEVISION: 3
8. MOVING OR SPEAKING SO SLOWLY THAT OTHER PEOPLE COULD HAVE NOTICED. OR THE OPPOSITE, BEING SO FIGETY OR RESTLESS THAT YOU HAVE BEEN MOVING AROUND A LOT MORE THAN USUAL: 2
1. LITTLE INTEREST OR PLEASURE IN DOING THINGS: 2
SUM OF ALL RESPONSES TO PHQ QUESTIONS 1-9: 22
9. THOUGHTS THAT YOU WOULD BE BETTER OFF DEAD, OR OF HURTING YOURSELF: 2
SUM OF ALL RESPONSES TO PHQ9 QUESTIONS 1 & 2: 4
4. FEELING TIRED OR HAVING LITTLE ENERGY: 3
10. IF YOU CHECKED OFF ANY PROBLEMS, HOW DIFFICULT HAVE THESE PROBLEMS MADE IT FOR YOU TO DO YOUR WORK, TAKE CARE OF THINGS AT HOME, OR GET ALONG WITH OTHER PEOPLE: 2
6. FEELING BAD ABOUT YOURSELF - OR THAT YOU ARE A FAILURE OR HAVE LET YOURSELF OR YOUR FAMILY DOWN: 2
5. POOR APPETITE OR OVEREATING: 3

## 2020-02-20 ENCOUNTER — OFFICE VISIT (OUTPATIENT)
Dept: PSYCHIATRY | Age: 35
End: 2020-02-20

## 2020-02-20 VITALS
DIASTOLIC BLOOD PRESSURE: 70 MMHG | HEART RATE: 68 BPM | BODY MASS INDEX: 30.19 KG/M2 | SYSTOLIC BLOOD PRESSURE: 110 MMHG | WEIGHT: 176.8 LBS | HEIGHT: 64 IN

## 2020-02-20 PROCEDURE — 99214 OFFICE O/P EST MOD 30 MIN: CPT | Performed by: NURSE PRACTITIONER

## 2020-02-20 ASSESSMENT — PATIENT HEALTH QUESTIONNAIRE - PHQ9
4. FEELING TIRED OR HAVING LITTLE ENERGY: 2
2. FEELING DOWN, DEPRESSED OR HOPELESS: 2
SUM OF ALL RESPONSES TO PHQ QUESTIONS 1-9: 12
5. POOR APPETITE OR OVEREATING: 1
SUM OF ALL RESPONSES TO PHQ9 QUESTIONS 1 & 2: 3
SUM OF ALL RESPONSES TO PHQ QUESTIONS 1-9: 12
6. FEELING BAD ABOUT YOURSELF - OR THAT YOU ARE A FAILURE OR HAVE LET YOURSELF OR YOUR FAMILY DOWN: 2
7. TROUBLE CONCENTRATING ON THINGS, SUCH AS READING THE NEWSPAPER OR WATCHING TELEVISION: 2
10. IF YOU CHECKED OFF ANY PROBLEMS, HOW DIFFICULT HAVE THESE PROBLEMS MADE IT FOR YOU TO DO YOUR WORK, TAKE CARE OF THINGS AT HOME, OR GET ALONG WITH OTHER PEOPLE: 0
3. TROUBLE FALLING OR STAYING ASLEEP: 2
1. LITTLE INTEREST OR PLEASURE IN DOING THINGS: 1
8. MOVING OR SPEAKING SO SLOWLY THAT OTHER PEOPLE COULD HAVE NOTICED. OR THE OPPOSITE, BEING SO FIGETY OR RESTLESS THAT YOU HAVE BEEN MOVING AROUND A LOT MORE THAN USUAL: 0
9. THOUGHTS THAT YOU WOULD BE BETTER OFF DEAD, OR OF HURTING YOURSELF: 0

## 2020-02-20 ASSESSMENT — ANXIETY QUESTIONNAIRES
2. NOT BEING ABLE TO STOP OR CONTROL WORRYING: 0-NOT AT ALL
1. FEELING NERVOUS, ANXIOUS, OR ON EDGE: 2-OVER HALF THE DAYS
GAD7 TOTAL SCORE: 8
3. WORRYING TOO MUCH ABOUT DIFFERENT THINGS: 1-SEVERAL DAYS
5. BEING SO RESTLESS THAT IT IS HARD TO SIT STILL: 0-NOT AT ALL
4. TROUBLE RELAXING: 1-SEVERAL DAYS
7. FEELING AFRAID AS IF SOMETHING AWFUL MIGHT HAPPEN: 2-OVER HALF THE DAYS
6. BECOMING EASILY ANNOYED OR IRRITABLE: 2-OVER HALF THE DAYS

## 2020-02-20 NOTE — PROGRESS NOTES
PSYCHIATRY PROGRESS NOTE    Vicki Fregoso  1985 2/20/20      Face to Face time: 35 mins  CC:   Chief Complaint   Patient presents with    Follow-up     Per excerpt of initial eval with this provider 11/2018:    Ander Rosado  \"I'm a mess\". 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 Says \"I know it's bad when i'm referred by dr Domingo Augustin. Told me \"I know you're fighting a lot more than youre letting on. Don't want to lose you. \"      Longstanding h/o anxiety. Can recall not wanting to sharpen pencil in  because feared people would watch her so sat in seat and cried because was terrified to move. First panic attack at 8th grade science fair. From then on, knew couldn't be in middle of gym. Struggles in crowds, can't be in middle, has to be in open space away from others. In school would eat lunch with counselor for part of middle school, half of sabino year, and all of senior year d/t severe anxiety. Is challenge because son is social butterfly. Hard for me because I feel like I hold him back. Terrifies me to do things. Don't want to hold him back      Routine driven. Struggles with change. Likes to know what's anticipated of her. Recent issues with work, unpredictable with schedule. Feels guilt for leaving son at  if has to stay late.       Describes panic attacks as chest \"locking up\", struggles to breathe.       Had episode in college was one minute late to class. As soon as walked in felt everyone's eyes on her, staring at her. Passed out. Woke up to professor and people standing over her. Now if late will not enter class, would sit outside of class and take notes but would refuse to walk in late. If 5 people on elevator, won't get on elevator or will exit and take stairs. Lived on 11th floor of dorm, would take stairs often     Everyday, always anxious. Feels like bottle of pop been shaken up, feels like ready to explode. Have to psych myself up to do anything. Always have an escape plan. Will use any viable excuse to leave social events so doesn't have to stay too long     Had several stressors over past couple years. 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 Some days can maintain happy persona. Other days don't want to be talked to. Some days feel like screaming. Feel bad for my co- workers. Put all my effort into patients. Not fair to them. Not very nice at times.       Dx sleep disorder, 6/2018. Rx armodofinil past few months. Now staying awake. Energy remains low. Now that is more awake and aware, anxiety more prevalent     Had seen tracy frost for therapy in past.  [facility]. Was going to do emdr for ptsd, but couldn't start d/t financial constraints.       Son's dad sees him every other weekend. Is remarried, has another child. Now the father wants to spend more time with son. His wife has to have things done certain way, pushy. Pt has an agreement in place with dad, then things get changed because she (ex's wife) wants it certain way. Pt feels helpeless, feels like don't have a choice because would hurt her son if didn't allow to see his father. Fears one day they will say they want full custody, feels is heading that direction. Thinks they would say she doesn't take him anywhere because of anxiety. Struggled with idea of seeking treatment, doesn't want it held against her but motivated for treatment because doesn't want to hold him back from doing things. Work becomes trigger, don't leave on time. Son at . Has discussed with manager.   Feels guilt for being away from son        HPI:   Mike Bella is a 29 y.o. female with h/o flashbacks, hypervigilance, easily startled, decreased sleep, reliving the event, avoiding situations that remind you of trauma, physical and mental paralysis when reminded of the experience, same despair, easily angry or irritable, trouble concentrating, fear for safety,  Numbness of emotions, feeling of detachment; dissociative episodes     Eating disorders: DENIES RECENTsome binge eating over past 1-2 years. If frustrated or upset will hit several fast food places; infrequent; + guilt; uses as way to control situation she otherwise feels no control over       EMA 7 SCORE 2/20/2020 12/19/2019 10/21/2019 8/29/2019 7/15/2019 5/20/2019 4/15/2019   EMA-7 Total Score 8 13 17 8 18 15 15     Interpretation of EMA-7 score: 5-9 = mild anxiety, 10-14 = moderate anxiety,   15+ = severe anxiety. Recommend referral to behavioral health for scores 10 or greater.     PHQ-9 Total Score: 12 (2/20/2020  9:33 AM)  Thoughts that you would be better off dead, or of hurting yourself in some way: 0 (2/20/2020  9:33 AM)  PHQ-9 Total Score: 22 (12/19/2019  1:02 PM)  Thoughts that you would be better off dead, or of hurting yourself in some way: 2 (12/19/2019  1:02 PM)  PHQ-9 Total Score: 17 (10/21/2019 12:42 PM)  Thoughts that you would be better off dead, or of hurting yourself in some way: 0 (10/21/2019 12:42 PM)  PHQ-9 Total Score: 10 (8/29/2019  9:43 AM)  PHQ-9 Total Score: 24 (7/15/2019  2:55 PM)  PHQ-9 Total Score: 17 (5/20/2019  1:40 PM)  PHQ-9 Total Score: 13 (4/15/2019 10:43 AM)  PHQ-9 Total Score: 17 (3/7/2019 11:02 AM)  PHQ-9 Total Score: 25 (2/4/2019 10:14 AM)  PHQ-9 Total Score: 17 (1/2/2019 11:45 AM)   PHQ-9 Total Score: 20 (12/19/2018  1:04 PM)  PHQ-9 Total Score: 20 (11/21/2018  1:07 PM)   PHQ-9 Total Score: 21 (11/7/2018  9:05 AM)  Interpretation of PHQ-9 score:  1-4 = minimal depression, 5-9 = mild depression,   10-14 = moderate depression; 15-19 = moderately severe depression, 20-27 = severe depression      Past (TRINTELLIX) 5 MG tablet Take 10 mg by mouth daily      Multiple Vitamin (MULTI VITAMIN DAILY PO) Take by mouth       No current facility-administered medications on file prior to visit. Controlled Substance Monitoring:    Acute and Chronic Pain Monitoring:   RX Monitoring 10/21/2019   Attestation -   Periodic Controlled Substance Monitoring No signs of potential drug abuse or diversion identified.    Chronic Pain > 80 MEDD -     02/10/2020  1   10/28/2019  Armodafinil 250 MG Tablet  30.00 30 Ba Loraine   [id number]   Tammy ([id number])   2   Comm [facility]   12/09/2019  1   10/28/2019  Armodafinil 250 MG Tablet  30.00 30 Ba Loraine   [id number]   Tammy ([id number])   1   Comm [facility]   10/28/2019  1   10/28/2019  Armodafinil 250 MG Tablet  30.00 30 Ba Loraine   [id number]   Tammy ([id number])   0   Comm [facility]   08/05/2019  1   04/22/2019  Armodafinil 250 MG Tablet  30.00 30 Ba Loraine   [id number]   Tammy ([id number])   2   Comm [facility]   05/24/2019  1   04/22/2019  Armodafinil 250 MG Tablet  30.00 30 Ba Loraine   [id number]   Tammy ([id number])   1   Comm [facility]   04/22/2019  1   04/22/2019  Armodafinil 250 MG Tablet  30.00 30 Ba Loraine   [id number]   Tammy ([id number])   0   Comm [facility]   02/04/2019  1   02/04/2019  Lorazepam 0.5 MG Tablet  28.00 14 Ch Wet   [id number]   Tammy ([id number])   0  1.00 LME  Comm [facility]   02/04/2019  1   08/20/2018  Armodafinil 250 MG Tablet  30.00 30 Ba Loraine   [id number]   Tammy ([id number])   4   Comm [facility]   01/02/2019  1   01/02/2019  Clonazepam 0.5 MG Tablet  30.00 30 Ch Wet   [id number]   Tammy ([id number])   0  1.00 LME  Comm [facility]   12/19/2018  1   08/20/2018  Armodafinil 250 MG Tablet  30.00 30 Ba Loraine   [id number]   Tammy ([id number])   3   Comm [facility]   11/04/2018  1   08/20/2018  Armodafinil 250 MG Tablet  30.00 30 Ba Loraine   [id number]   Tammy ([id number])   2   Comm Ins   [facility]   09/23/2018  1   08/20/2018  Armodafinil 250 MG Tablet  30.00 30 Ba Loraine   [id number]   Tammy ([id number])   1   Comm Ins   [place]   08/21/2018  1   08/20/2018  Armodafinil 250 MG Tablet  30.00 30 Ba Loraine   [id number]   Tammy ([id number])   0 [place] Ins   [place]   07/13/2018  1   07/12/2018  Armodafinil 150 MG Tablet  30.00 30 Ba Loraine   [id number]   Sary ([id number])   0   [place] Ins   [place]   07/12/2018  1   07/12/2018  Armodafinil 150 MG Tablet  30.00 30 Ba Loraine   [id number]   Sary ([id number])   0   [place] Ins   [place]           OBJECTIVE:  Vitals: Wt Readings from Last 3 Encounters:   02/20/20 176 lb 12.8 oz (80.2 kg)   12/19/19 177 lb (80.3 kg)   10/21/19 181 lb 9.6 oz (82.4 kg)       Vitals:    02/20/20 0926   BP: 110/70   Site: Right Upper Arm   Position: Sitting   Cuff Size: Medium Adult   Pulse: 68   Weight: 176 lb 12.8 oz (80.2 kg)   Height: 5' 3.75\" (1.619 m)         ROS: Denies trouble with fever, rash, headache, vision changes, chest pain, shortness of breath, nausea, extremity pain, weakness, dysuria. Mental Status Exam:      Appearance    alert, cooperative, appropriate dress for season, adequate hygiene, appears younger than stated age, smiling appropriately  Muscle strength/tone: no atrophy or abnormal movements  Gait/station: normal  Speech    spontaneous, normal rate, soft volume and well articulated  Mood    euthymic  Affect   normal,  Congruent to thought content and mood  Thought Content    intact, no delusions voiced  Thought Process    coherent   Associations    logical connections  Perceptions: denies AH/VH, does not appear preoccupied with the internal environment  Insight    Good  Judgment    Intact  Orientation    oriented to person, place, time, and general circumstances  Memory    recent and remote memory intact  Attention/Concentration    intact  Ability to understand instructions Yes  Ability to respond meaningfully Yes  Language: [address] [place] [address] of knowledge/Intellect: Average  SI:   denies suicidal ideation  HI: Denies HI      Labs:     No visits with results within 6 Month(s) from this visit.    Latest known visit with results is:   Admission on 01/28/2019, Discharged on 01/28/2019   Component Date Value    Ventricular Rate 01/28/2019 91     [employer] company to see if can get approval d/t low income     - declined other med adjustments at this point    - is prescribed armodofinil by pulmonary clinic    - historically doesn't want sedating medications. Considered seroquel. could help racing thoughts, mood and psychotic sx (feels like bugs crawling, has since improved). Declined d/t potential sedation     -  continue ativan 0.5mg bid prn   Historically Always used benzos (was on clonazepam prior to ativan) sparingly. - never tried hydroxyzine (was ordered prn nausea and sleep). -Labs: reviewed in Epic, up to date    -unable to continue outpt therapy. was [facility]. Hasn't seen in several months d/t no insurance, no income. RAÚL signed. He prefers email communication to set [email or web address]  [phone]    - consider EMDR vs ptsd tx at [phone]. Would prefer mood and anxiety be under better control before initiating,     -OARRS reviewed, c/w history  -R/b/se/a d/w pt who consents.     3. Medical  -Following with Armen Stout MD     4. Substance   -No active issues.     5.  RTC - 12 weeks        Madison Keller, [facility]  Psychiatric Nurse Practitioner

## 2020-05-13 RX ORDER — ARMODAFINIL 250 MG/1
TABLET ORAL
Qty: 30 TABLET | Refills: 5 | Status: SHIPPED | OUTPATIENT
Start: 2020-05-13 | End: 2020-12-09 | Stop reason: SDUPTHER

## 2020-05-18 ENCOUNTER — VIRTUAL VISIT (OUTPATIENT)
Dept: SLEEP MEDICINE | Age: 35
End: 2020-05-18

## 2020-05-18 ENCOUNTER — OFFICE VISIT (OUTPATIENT)
Dept: PSYCHIATRY | Age: 35
End: 2020-05-18

## 2020-05-18 PROCEDURE — 99213 OFFICE O/P EST LOW 20 MIN: CPT | Performed by: PSYCHIATRY & NEUROLOGY

## 2020-05-18 PROCEDURE — 99443 PR PHYS/QHP TELEPHONE EVALUATION 21-30 MIN: CPT | Performed by: NURSE PRACTITIONER

## 2020-05-18 NOTE — PROGRESS NOTES
Grandmother         lymphoma    High Blood Pressure Paternal Grandmother     Stroke Other        Review of Systems    Objective:     Vitals:  Weight BMI Neck circumference    Wt Readings from Last 3 Encounters:   02/20/20 176 lb 12.8 oz (80.2 kg)   12/19/19 177 lb (80.3 kg)   10/21/19 181 lb 9.6 oz (82.4 kg)    There is no height or weight on file to calculate BMI. BP HR SaO2   BP Readings from Last 3 Encounters:   02/20/20 110/70   12/19/19 130/78   10/21/19 110/70    Pulse Readings from Last 3 Encounters:   02/20/20 68   12/19/19 80   10/21/19 60    SpO2 Readings from Last 3 Encounters:   01/28/19 99%   01/24/19 98%   05/10/18 95%        Physical Exam    :          Diagnosis Orders   1. Idiopathic hypersomnolence     2. Vivid dream       Plan: Will continue the Nuvigil 250 mg. No orders of the defined types were placed in this encounter. Return for EDS.     Katherin Hicks MD  Medical Director - [facility]
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
## 2020-05-19 RX ORDER — LORAZEPAM 0.5 MG/1
0.5 TABLET ORAL 2 TIMES DAILY PRN
Qty: 60 TABLET | Refills: 0 | Status: SHIPPED | OUTPATIENT
Start: 2020-05-19 | End: 2020-06-18

## 2020-08-20 ENCOUNTER — OFFICE VISIT (OUTPATIENT)
Dept: PSYCHIATRY | Age: 35
End: 2020-08-20

## 2020-08-20 PROCEDURE — 99443 PR PHYS/QHP TELEPHONE EVALUATION 21-30 MIN: CPT | Performed by: NURSE PRACTITIONER

## 2020-08-20 NOTE — PROGRESS NOTES
PSYCHIATRY PROGRESS NOTE    Shreyas Munoz  1985 8/20/20    Phone call start time: 12:35pm  Phone call end time: 1:02pm      CC:   Chief Complaint   Patient presents with    Follow-up     Per excerpt of initial eval with this provider 11/2018:    Momo Martinez  \"I'm a mess\". 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 Says \"I know it's bad when i'm referred by dr Ally Rios. Told me \"I know you're fighting a lot more than youre letting on. Don't want to lose you. \"      Longstanding h/o anxiety. Can recall not wanting to sharpen pencil in  because feared people would watch her so sat in seat and cried because was terrified to move. First panic attack at 8th grade science fair. From then on, knew couldn't be in middle of gym. Struggles in crowds, can't be in middle, has to be in open space away from others. In school would eat lunch with counselor for part of middle school, half of sabino year, and all of senior year d/t severe anxiety. Is challenge because son is social butterfly. Hard for me because I feel like I hold him back. Terrifies me to do things. Don't want to hold him back      Routine driven. Struggles with change. Likes to know what's anticipated of her. Recent issues with work, unpredictable with schedule. Feels guilt for leaving son at  if has to stay late.       Describes panic attacks as chest \"locking up\", struggles to breathe.       Had episode in college was one minute late to class. As soon as walked in felt everyone's eyes on her, staring at her. Passed out. Woke up to professor and people standing over her. Now if late will not enter class, would sit outside of class and take notes but would refuse to walk in late. If 5 people on elevator, won't get on elevator or will exit and take stairs. Lived on 11th floor of dorm, would take stairs often     Everyday, always anxious. Feels like bottle of pop been shaken up, feels like ready to explode.   Have to In program can go at own pace without specific timeline/pressure. Roped into  of PTA again for another year. 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 In charge of developing the whole program.  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 Got my certification over quarantine. i'm now only one in the building certified to teach    Trying to use coping strategies learned in therapy to deal with challenging times without medication    Son will be virtual school for first 5 weeks. He stays with my friend greg during day, she will help with the remote learnind during day. Still engaged in karate. He gained weight, crying all the time. Was depressed. Misses his friends. Only taken ativan x 2 since last visit 5/2020        Substance:   ETOH:  \"not really\"   Illicits:  denies   Caffeine:  Coffee, 1-2 cups/in morning   Tobacco:  Cigarettes 1 pack every 2 days. Buys 3 packs at a time so reduces trips to store so rations      Psych ROS:      Depression:  Rates 5-6/10 (10 best), most days. Not super happy. More depressed 2/2 recent student death. Sleep \"ok\". Mostly medication thing, I either sleep all day and can't sleep all night or take it too late and am awake, then can't go to sleep because go to sleep too late. Sleep been ok lately since consistent wiith armodafinil    Appetite ok. \"not really eating much\"  Normal eating habits. thinks maintaining at 160#. Not weighing self regularly. Not having addedd stress of world helped to lose it. Was 30# reduction over few months. Most days not struggling with feelings of hopeless/helpless, but last week did struggle after student's OD/death. increased isolation, avoids crowds.        DENIES SI/HI      Ana: DENIES RECENT:   intermittent episodes (lasting day or less) of elevated moods but fears feeling like this, anticipates PARANOIA   HISTORICALLY From time to time, feels like something crawling on me or touching me, glance and think see things that aren't there. So unnerving. Started having dog out with me more because he will know if there is something there. Hears voices, doors closed. Dog acts like support animal.     denies delusions     ADHD:  denies     PTSD: LESS FREQUENT nightmares (mostly about son, interactions with people)  Rare flashbacks, hypervigilance, easily startled, decreased sleep, reliving the event, avoiding situations that remind you of trauma, physical and mental paralysis when reminded of the experience, same despair, easily angry or irritable, trouble concentrating, fear for safety,  Numbness of emotions, feeling of detachment; dissociative episodes     Eating disorders: DENIES RECENTsome binge eating over past 1-2 years. If frustrated or upset will hit several fast food places; infrequent; + guilt; uses as way to control situation she otherwise feels no control over       EMA 7 SCORE 2/20/2020 12/19/2019 10/21/2019 8/29/2019 7/15/2019 5/20/2019 4/15/2019   EMA-7 Total Score 8 13 17 8 18 15 15     Interpretation of EMA-7 score: 5-9 = mild anxiety, 10-14 = moderate anxiety,   15+ = severe anxiety. Recommend referral to behavioral health for scores 10 or greater.     No data recorded  PHQ-9 Total Score: 22 (12/19/2019  1:02 PM)  Thoughts that you would be better off dead, or of hurting yourself in some way: 2 (12/19/2019  1:02 PM)  PHQ-9 Total Score: 17 (10/21/2019 12:42 PM)  Thoughts that you would be better off dead, or of hurting yourself in some way: 0 (10/21/2019 12:42 PM)  PHQ-9 Total Score: 10 (8/29/2019  9:43 AM)  PHQ-9 Total Score: 24 (7/15/2019  2:55 PM)  PHQ-9 Total Score: 17 (5/20/2019  1:40 PM)  PHQ-9 Total Score: 13 (4/15/2019 10:43 AM)  PHQ-9 Total Score: 17 (3/7/2019 11:02 AM)  PHQ-9 Total Score: 25 (2/4/2019 10:14 AM)  PHQ-9 Total Score: 17 (1/2/2019 11:45 AM)   PHQ-9 Total Score: 20 (12/19/2018  1:04 PM)  PHQ-9 Total Score: 20 (11/21/2018  1:07 PM)   PHQ-9 Total Score: 21 (11/7/2018  9:05 AM)  Interpretation of PHQ-9 score:  1-4 = minimal depression, 5-9 = mild depression,   10-14 = moderate depression; 15-19 = moderately severe depression, 20-27 = severe depression      Past Psychiatric History:               Prior hospitalizations: denies              Prior diagnoses:  EMA, panic d/o without agoraphobia, ptsd                Outpatient Treatment:                           Psychiatrist: russell                          Therapist: todd mcknight and tracy frost; some school therapists in past              Suicide Attempts:  denies              Hx SH:  denies     Past Psychopharmacologic Trials (including response/reactions):  1. Lexapro:  Stopped 11/2017, was tired all time, couldn't function; started running (was racing), felt dulled, was on 20mg/day  2. Clonazepam:  Still technically on this, has in case of \"shut down moment\". Will pass out if gets too much. Haven't been to that state since college but has come close to it, 1-2 months ago at work, [employer] reign it in, couldn't breathe, usually good at hiding it but everyone knew I wasn't functioning right. Gets tunnel vision, depth perception gets off  3. Viibryd:  Nausea  4. Prozac:  Muscle pain  5. Chantix:  Ha's, agitation, impaired sleep        Past Medical/Surgical History:   Past Medical History:   Diagnosis Date    Cervical incompetence     Tobacco dependence      Past Surgical History:   Procedure Laterality Date    APPENDECTOMY  2005       Family History   Problem Relation Age of Onset    Breast Cancer Maternal Aunt 39    Cancer Maternal Grandmother         brain    Alzheimer's Disease Maternal Grandmother     Cancer Paternal Grandmother         lymphoma    High Blood Pressure Paternal Grandmother     Stroke Other          PCP: Dhaval Capone MD      Allergies:    Allergies   Allergen Reactions    Vicodin Intact  Orientation    oriented to person, place, time, and general circumstances  Memory    recent and remote memory intact  Attention/Concentration    intact  Ability to understand instructions Yes  Ability to respond meaningfully Yes  Language: [address] of knowledge/Intellect: Average  SI:   denies suicidal ideation  HI: Denies HI      Labs:     No visits with results within 6 Month(s) from this visit. Latest known visit with results is:   Admission on 01/28/2019, Discharged on 01/28/2019   Component Date Value    Ventricular Rate 01/28/2019 91     Atrial Rate 01/28/2019 91     P-R Interval 01/28/2019 168     QRS Duration 01/28/2019 80     Q-T Interval 01/28/2019 334     QTc Calculation (Bazett) 01/28/2019 410     P Axis 01/28/2019 34     R Axis 01/28/2019 29     T Axis 01/28/2019 12     Diagnosis 01/28/2019 Normal sinus rhythmNormal ECGNo previous ECGs availableConfirmed by Chelo English MD, Vianca Logan ([id number]) on 1/28/2019 5:12:09 PM        Last Drug screen:  Lab Results   Component Value Date    LABAMPH Neg 06/29/2018    LABBENZ Neg 06/29/2018    COCAIMETSCRU Neg 06/29/2018    LABMETH Neg 06/29/2018    OPIATESCREENURINE Neg 06/29/2018    PHENCYCLIDINESCREENURINE Neg 06/29/2018           Imaging: no head imaging on file        ASSESSMENT AND PLAN     Diagnosis Orders   1. EAM (generalized anxiety disorder)     2. Panic disorder without agoraphobia     3. Mood disorder ([facility] [address]     4. PTSD (post-traumatic stress disorder)          1. Safety: NO Imminent risk of danger to/self/others based on the factors considered below. Appropriate for outpatient level of care. Safety plan includes: 911, PES, hotlines, and interventions discussed today.    Risk factors: depressed mood, social isolation,  no outpatient services in place, and no collateral information to support safety.     Protective factors: Age >25 and <55, female gender,  denies  suicidal ideation, does not have lethal plan, does not have access to guns or weapons, patient is beatriz for safety, no prior suicide attempts, no substance abuse, no active psychosis or cognitive dysfunction, physically healthy, and patient is future oriented.            2. Psychiatric  - currently reports significant improvement in mood and anxiety symptoms with current regimen of trintellix 10mg/day + armodofinil (by pulmonary)    - has tried/failed 3 prior ssri's   - was denied by [facility] for assistance with Trintellix. Wasn't able to get medicaid. Plans to reach out to [facility] program again to see if now eligible since insurance reinstated (but poor coverage)    - continue trintellix 10mg/day. Will get samples until gets insurance coverage.       - declined other med adjustments at this point    - is prescribed armodofinil by pulmonary clinic    - historically doesn't want sedating medications. Considered seroquel. could help racing thoughts, mood and psychotic sx (feels like bugs crawling, has since improved). Declined d/t potential sedation     -  continue ativan 0.5mg bid prn   Historically Always used benzos (was on clonazepam prior to ativan) sparingly. - never tried hydroxyzine (was ordered prn nausea and sleep). -Labs: reviewed in Epic, up to date    -unable to continue outpt therapy. was seeing Mili [facility]. Hasn't seen in several months d/t no insurance, no income. RAÚL signed. He prefers email communication to set [email or web address]:  [phone]    - consider EMDR vs ptsd tx at [address] Would prefer mood and anxiety be under better control before initiating,     -OARRS reviewed, c/w history  -R/b/se/a d/w pt who consents.     3. Medical  -Following with Lucretia North MD     4. Substance   -No active issues.     5.  RTC - 12 weeks        Hina Delgadillo [facility]  Psychiatric Nurse Practitioner

## 2020-10-22 ENCOUNTER — TELEPHONE (OUTPATIENT)
Dept: PRIMARY CARE CLINIC | Age: 35
End: 2020-10-22

## 2020-10-22 NOTE — TELEPHONE ENCOUNTER
Patient would like for Maureen to give her a call. She would not say what she needed other than she needed to speak to [facility].

## 2020-12-10 RX ORDER — ARMODAFINIL 250 MG/1
TABLET ORAL
Qty: 30 TABLET | Refills: 5 | Status: SHIPPED | OUTPATIENT
Start: 2020-12-10 | End: 2020-12-16 | Stop reason: SDUPTHER

## 2020-12-15 NOTE — PROGRESS NOTES
MD AZEB Mccauley Board Certified in Sleep Medicine  Certified in [address] [place] [address] Certified in Neurology [address]  [address]  [phone]   [address] [place] [address], [address] [place] [address]                      [address]  [address] [place] [id number] [phone]    Subjective:     Patient ID: Laura Myrick is a 28 y.o. female. No chief complaint on file. This is a video visual telehealth visit at patient home, no physical exam performed Patient agreed for this visit. HPI:        Laura Myrick is a 28 y.o. female was seen today as a follow for idiopathic hypersomnolence    The Patient scored   on Curtiss Sleepiness Scale ( more than 10 is indicative of daytime sleepiness)   Doing fine on Nuvigil 250 mg , takes it at 6 :30 and goes to bed at 10:30 PM    DOT/CDL - N/A        Previous Report(s)Reviewed: historical medical records         Social History     Socioeconomic History    Marital status: Single     Spouse name: Not on file    Number of children: Not on file    Years of education: Not on file    Highest education level: Not on file   Occupational History    Occupation: Medical Assistant   Social Needs    Financial resource strain: Not on file    Food insecurity     Worry: Not on file     Inability: Not on file   [employer] needs     Medical: Not on file     Non-medical: Not on file   Tobacco Use    Smoking status: Current Every Day Smoker     Packs/day: 0.50     Years: 11.00     Pack years: 5.50     Types: Cigarettes    Smokeless tobacco: Never Used   Substance and Sexual Activity    Alcohol use:  Yes     Alcohol/week: 0.0 standard drinks     Comment: twice monthly    Drug use: No    Sexual activity: Not Currently   Lifestyle    Physical activity     Days per week: Not on file  High Blood Pressure Paternal Grandmother     Stroke Other        Review of Systems   Constitutional: Negative for fatigue. HENT: Negative. Eyes: Negative. Respiratory: Negative for apnea and choking. Cardiovascular: Negative. Negative for leg swelling. Gastrointestinal: Negative. Endocrine: Negative. Genitourinary: Negative. Musculoskeletal: Negative. Skin: Negative. Allergic/Immunologic: Negative. Neurological: Negative. Negative for headaches. Hematological: Negative. Psychiatric/Behavioral: Negative. Objective:     Vitals:  Weight BMI Neck circumference    Wt Readings from Last 3 Encounters:   02/20/20 176 lb 12.8 oz (80.2 kg)   12/19/19 177 lb (80.3 kg)   10/21/19 181 lb 9.6 oz (82.4 kg)    There is no height or weight on file to calculate BMI. BP HR SaO2   BP Readings from Last 3 Encounters:   02/20/20 110/70   12/19/19 130/78   10/21/19 110/70    Pulse Readings from Last 3 Encounters:   02/20/20 68   12/19/19 80   10/21/19 60    SpO2 Readings from Last 3 Encounters:   01/28/19 99%   01/24/19 98%   05/10/18 95%          Physical Exam    :        Diagnosis Orders   1. Idiopathic hypersomnolence  Armodafinil 250 MG TABS     Plan: Will continue the         No orders of the defined types were placed in this encounter. Return in about 6 months (around 6/16/2021), or EDS.     Hilton Coley MD  Medical Director [address] [place] [address]

## 2020-12-16 ENCOUNTER — VIRTUAL VISIT (OUTPATIENT)
Dept: SLEEP MEDICINE | Age: 35
End: 2020-12-16
Payer: COMMERCIAL

## 2020-12-16 ENCOUNTER — TELEPHONE (OUTPATIENT)
Dept: PULMONOLOGY | Age: 35
End: 2020-12-16

## 2020-12-16 PROCEDURE — 99213 OFFICE O/P EST LOW 20 MIN: CPT | Performed by: PSYCHIATRY & NEUROLOGY

## 2020-12-16 RX ORDER — ARMODAFINIL 250 MG/1
TABLET ORAL
Qty: 30 TABLET | Refills: 5 | Status: SHIPPED | OUTPATIENT
Start: 2020-12-16 | End: 2021-07-01 | Stop reason: SDUPTHER

## 2020-12-16 ASSESSMENT — ENCOUNTER SYMPTOMS
GASTROINTESTINAL NEGATIVE: 1
CHOKING: 0
APNEA: 0
EYES NEGATIVE: 1
ALLERGIC/IMMUNOLOGIC NEGATIVE: 1

## 2020-12-17 ENCOUNTER — OFFICE VISIT (OUTPATIENT)
Dept: PSYCHIATRY | Age: 35
End: 2020-12-17

## 2020-12-17 PROCEDURE — 99443 PR PHYS/QHP TELEPHONE EVALUATION 21-30 MIN: CPT | Performed by: NURSE PRACTITIONER

## 2020-12-17 RX ORDER — LORAZEPAM 0.5 MG/1
0.5 TABLET ORAL DAILY PRN
Qty: 30 TABLET | Refills: 0 | Status: SHIPPED | OUTPATIENT
Start: 2020-12-17 | End: 2021-01-16

## 2020-12-17 NOTE — PROGRESS NOTES
PSYCHIATRY PROGRESS NOTE    Vicki Love  1985 12/17/20      Phone call start time: 11:02am  Phone call end time: 11:45am      CC:   Chief Complaint   Patient presents with    Follow-up     Per excerpt of initial eval with this provider 11/2018:    Josué Singh  \"I'm a mess\". 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 Says \"I know it's bad when i'm referred by dr Frankey Sensing. Told me \"I know you're fighting a lot more than youre letting on. Don't want to lose you. \"      Longstanding h/o anxiety. Can recall not wanting to sharpen pencil in  because feared people would watch her so sat in seat and cried because was terrified to move. First panic attack at 8th grade science fair. From then on, knew couldn't be in middle of gym. Struggles in crowds, can't be in middle, has to be in open space away from others. In school would eat lunch with counselor for part of middle school, half of sabino year, and all of senior year d/t severe anxiety. Is challenge because son is social butterfly. Hard for me because I feel like I hold him back. Terrifies me to do things. Don't want to hold him back      Routine driven. Struggles with change. Likes to know what's anticipated of her. Recent issues with work, unpredictable with schedule. Feels guilt for leaving son at  if has to stay late.       Describes panic attacks as chest \"locking up\", struggles to breathe.       Had episode in college was one minute late to class. As soon as walked in felt everyone's eyes on her, staring at her. Passed out. Woke up to professor and people standing over her. Now if late will not enter class, would sit outside of class and take notes but would refuse to walk in late. If 5 people on elevator, won't get on elevator or will exit and take stairs.   Lived on 11th floor of dorm, would take stairs often    Everyday, always anxious. Feels like bottle of pop been shaken up, feels like ready to explode. Have to psych myself up to do anything. Always have an escape plan. Will use any viable excuse to leave social events so doesn't have to stay too long     Had several stressors over past couple years. 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 Some days can maintain happy persona. Other days don't want to be talked to. Some days feel like screaming. Feel bad for my co- workers. Put all my effort into patients. Not fair to them. Not very nice at times.       Dx sleep disorder, 6/2018. Rx armodofinil past few months. Now staying awake. Energy remains low. Now that is more awake and aware, anxiety more prevalent     Had seen tracy frost for therapy in past.  Was referred to [employer] [facility], insurance didn't cover.   Was going to do emdr for ptsd, but couldn't start d/t financial constraints.      Son's dad sees him every other weekend. Is remarried, has another child. Now the father wants to spend more time with son. His wife has to have things done certain way, pushy. Pt has an agreement in place with dad, then things get changed because she (ex's wife) wants it certain way. Pt feels helpeless, feels like don't have a choice because would hurt her son if didn't allow to see his father. Fears one day they will say they want full custody, feels is heading that direction. Thinks they would say she doesn't take him anywhere because of anxiety. Struggled with idea of seeking treatment, doesn't want it held against her but motivated for treatment because doesn't want to hold him back from doing things. Work becomes trigger, don't leave on time. Son at . Has discussed with manager. Feels guilt for being away from son        HPI:   Lary Foley is a 28 y.o. female with h/o anxiety, panic and ptsd who was contacted via telehealth for follow up. Due to the COVID-19 pandemic restrictions on close contact interactions as recommended by CDC and health authorities, the patient's visit was conducted via telehealth (phone call visit) in lieu of a face to face visit. Patient verbally consented and agreed to proceed. Since last visit 8/20/20,    \"surviving\"  \"really tough couple weeks/months\"   Some caused by pandemic. Other parts just life in general.  Missing things, off trintellix for while    Last time I called them they said were waiting on state ID and dose clarification. MA told me had sent that info in October. Called them back beginning of month, said they hadn't heard from office. I know when she says she does something, she does. Haven't called them back since spoke to MA. Just needed ID # and clarification. Knew once daily, but in morning, prn or what. Questioned how many tabs per month.   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. Working more d/t staff out d/t covid. Roommate (ismael) moved out. Told her etoh not allowed in house so she left. Eventually went to inpt rehab x 1.5 weeks. So I had to teach her clinics in addition to my stuff    Valentino's been remote entire school year. Struggling badly. Set up counseling. He's declining. He's a social kid. i've always been to myself. So pandemic ok for me. Brain Other, is like putting flower in closet, just withering up. Disconnected. No happiness in anything. Had missing assignments. Completely checked out. Having urinary and fecal incontinence issues again. Hoarding/hiding food. Finding food wrappers under pillow. Binge eating. Entire container of Halloween candy. Says can't stop himself    Talked to his dad. Said he needs to step up, talk to him. Needs consistent boundaries at both households. He does every other weekend as usual, occ picks up extra days during week. Has been more present/engaged in his life lately so that's helpful    Very stressful. Doing stuff online. Created another [employer] account, used my bday to have full access. My identity got stolen so they wiped my accounts. Happened 1st week of November. Was able to pay rent/phone/internet. Everything else went unpaid.      Trying to dig out of that hole again Been rough. Alan this week. One student decided to raise hell. Don't know what's wrong with her. Had to go to corporate level. Emotional in general.  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 The student I lost would have been graduating with them. Really hard sending other 2 out knowing she would have been with them. Had another student. Know shouldn't matter have disgruntled student. But having someone say I don't care about my students and should be replaced by someone who actually cares. In contact with parents but can't see them. Both were transferred to covid units. Not seen since September    Trying to regulate emotions been difficult. Trying to rationalize irrational thoughts    Out of medicine (trintellix) again for months now. insurance was reinstated, doesn't cover much. [employer] program not yet kicked in. Supposedly needs clarification but unclear what they need exactly. Trying to remain hopeful. Taking:  armodafinil daily. Feels trintellix and armodafinil work synergistically for her. Can tell negative difference if either is missing.  - ativan prn        to see since insurance reactivated, will she now qualify for assistance. Last time was told should qualify for medicaid since didn't have insurance (though was d/t covid issues), so thinks should now (hopefully) be approved         Not currently in school for bachelors and masters degree, on hold for the moment. Was online for medical adminisration. In program can go at own pace without specific timeline/pressure. Substance:   ETOH:  \"not excessively\"   Illicits:  denies   Caffeine:  Coffee, 1-2 cups/in morning   Tobacco:  Cigarettes 1 pack every 2 days. Psych ROS:      Depression:  Rates 4-5/10 (10 best),   Sleep \"ok\". Not enough.  Can nap during day even with armodofinil, not in past month Appetite ok. \"at least one meal/day\"  ama fell into intermittent fasting. thinks maintaining at 160#.  weighing self occasionally  If someone comments jorge lost weight. Doing ADLs    DENIES hopeless/helpless    Has been dancing some days in evening before bed for exercise to get anxious energy out; doing yoga dailiy in morning with andressa. Got him back in karate. increased isolation (see my friend, Lukas Naqvi daily because she watches [employer], usually eat dinner together), other friend sets up zoom meetings so we can talk so she can go to [place] for holidays. avoids crowds. DENIES SI/HI      Ana: DENIES RECENT:   H/o intermittent episodes (lasting day or less) of elevated moods but fears feeling like this, anticipates what next bad thing going to happen. DENIES insomnia with increased energy, rapid speech, easily distracted or decreased attention, irritability, racing thoughts, expansive mood, increase in energy and goal directed behavior, grandiosity, flight of ideas         Anxiety: rates most days  6-7/10, (10 worst); mostly if interacting with people, if has to go to store, on drive in to work, driving in inclement weather     HISTORICALLY constant worry, generalized, \"what's going to happen next\"  Worried about finances, mom has been helping, but struggles to ask for help; + irritability with driving, no patience, sleep disruption, lots pacing, also pacing. averages 15,000-20,000 steps/day;   somatic complaints (ha's, muscle ache),   LESS fear of doing/saying wrong things, fear of judgement, no longer as avoidant of social situations  Triggered in crowds.    Goes to less busy stores.      OCD:  ONGOING need for symmetry and organization, will increase during times of high stress; has 2 of things; DENIES Repetitive actions or rituals, excessive hand washing, contamination fears, , violent thoughts, hoarding, fear of being harmed, mental or verbal repetition of words or phrases and counting     Panic:  a few. Have noticed correlation if house disorganized and messy, can't focus. Once clean I can. Will super fixate on needing the house in order. HISTORICALLY Feel super antsy, checks heart rate, is 143. Historically lasts until gets out of situation; has passed out in past; Shortness of breath, dizziness, diaphoresis, trembling, palpitations, feeing of choking, nausea, feeling outside of self, numbness, chills, hot flashes, chest discomfort and fear of dying    Psychosis:  On really anxious days, has feeling crumbs in bed when nothing there, double checking if think sees something out of the corner of my eye. During recent panic attack, \"felt like everything was crawling on me, seeing things   Did finally learn there was a mouse in the house so knows was seeing something. DENIES RECENT AVH OR PARANOIA   HISTORICALLY From time to time, feels like something crawling on me or touching me, glance and think see things that aren't there. So unnerving. Started having dog out with me more because he will know if there is something there. Hears voices, doors closed.   Dog acts like support animal.     denies delusions     ADHD:  denies     PTSD: occasional nightmares (mostly about son, interactions with people)  Rare flashbacks, hypervigilance, easily startled, decreased sleep, reliving the event, easily angry or irritable, trouble concentrating,     DENIES avoiding situations that remind you of trauma, physical and mental paralysis when reminded of the experience, same despair,  fear for safety,  Numbness of emotions, feeling of detachment; dissociative episodes      Eating disorders: DENIES RECENTsome binge eating over past 1-2 years. If frustrated or upset will hit several fast food places; infrequent; + guilt; uses as way to control situation she otherwise feels no control over       EMA 7 SCORE 2/20/2020 12/19/2019 10/21/2019 8/29/2019 7/15/2019 5/20/2019 4/15/2019   EMA-7 Total Score 8 13 17 8 18 15 15     Interpretation of EMA-7 score: 5-9 = mild anxiety, 10-14 = moderate anxiety,   15+ = severe anxiety. Recommend referral to behavioral health for scores 10 or greater.     No data recorded  PHQ-9 Total Score: 22 (12/19/2019  1:02 PM)  Thoughts that you would be better off dead, or of hurting yourself in some way: 2 (12/19/2019  1:02 PM)  PHQ-9 Total Score: 17 (10/21/2019 12:42 PM)  Thoughts that you would be better off dead, or of hurting yourself in some way: 0 (10/21/2019 12:42 PM)  PHQ-9 Total Score: 10 (8/29/2019  9:43 AM)  PHQ-9 Total Score: 24 (7/15/2019  2:55 PM)  PHQ-9 Total Score: 17 (5/20/2019  1:40 PM)  PHQ-9 Total Score: 13 (4/15/2019 10:43 AM)  PHQ-9 Total Score: 17 (3/7/2019 11:02 AM)  PHQ-9 Total Score: 25 (2/4/2019 10:14 AM)  PHQ-9 Total Score: 17 (1/2/2019 11:45 AM)   PHQ-9 Total Score: 20 (12/19/2018  1:04 PM)  PHQ-9 Total Score: 20 (11/21/2018  1:07 PM)   PHQ-9 Total Score: 21 (11/7/2018  9:05 AM)  Interpretation of PHQ-9 score:  1-4 = minimal depression, 5-9 = mild depression,   10-14 = moderate depression; 15-19 = moderately severe depression, 20-27 = severe depression      Past Psychiatric History:               Prior hospitalizations: denies              Prior diagnoses:  EMA, panic d/o without agoraphobia, ptsd                Outpatient Treatment:                           Psychiatrist: russell                          Therapist: todd frost; some school therapists in past              Suicide Attempts:  denies              Hx SH:  denies     Past Psychopharmacologic Trials (including response/reactions): 1.  Lexapro:  Stopped 11/2017, was tired all time, couldn't function; started running (was racing), felt dulled, was on 20mg/day  2. Clonazepam:  Still technically on this, has in case of \"shut down moment\". Will pass out if gets too much. Haven't been to that state since college but has come close to it, 1-2 months ago at work, [employer] reign it in, couldn't breathe, usually good at hiding it but everyone knew I wasn't functioning right. Gets tunnel vision, depth perception gets off  3. Viibryd:  Nausea  4. Prozac:  Muscle pain  5. Chantix:  Ha's, agitation, impaired sleep        Past Medical/Surgical History:   Past Medical History:   Diagnosis Date    Cervical incompetence     Tobacco dependence      Past Surgical History:   Procedure Laterality Date    APPENDECTOMY  2005       Family History   Problem Relation Age of Onset    Breast Cancer Maternal Aunt 39    Cancer Maternal Grandmother         brain    Alzheimer's Disease Maternal Grandmother     Cancer Paternal Grandmother         lymphoma    High Blood Pressure Paternal Grandmother     Stroke Other          PCP: Christy Parks MD      Allergies: Allergies   Allergen Reactions    Vicodin [Hydrocodone-Acetaminophen] Shortness Of Breath, Palpitations and Other (See Comments)     dizzyness         Current Medications:   Current Outpatient Medications on File Prior to Visit   Medication Sig Dispense Refill    Armodafinil 250 MG TABS TAKE 1 TABLET BY MOUTH IN THE MORNING AS NEEDED 30 tablet 5    Multiple Vitamin (MULTI VITAMIN DAILY PO) Take by mouth      VORTIoxetine (TRINTELLIX) 10 MG TABS tablet Take 1 tablet by mouth daily (Patient not taking: Reported on 8/20/2020) 30 tablet 3     No current facility-administered medications on file prior to visit.       Controlled Substance Monitoring:    Acute and Chronic Pain Monitoring:   RX Monitoring 12/17/2020   Attestation - 02/04/2019  1   02/04/2019  Lorazepam 0.5 MG Tablet  28.00  14 Ch Wet   [id number]   Tammy ([id number])   0  1.00 LME  Comm Ins   [place]   02/04/2019  1   08/20/2018  Armodafinil 250 MG Tablet  30.00  30 Ba Loraine   [id number]   Tammy ([id number])   4   Comm Ins   [place]   01/02/2019  1   01/02/2019  Clonazepam 0.5 MG Tablet  30.00  30 Ch Wet   [id number]   Tammy ([id number])   0  1.00 LME  [place]   [place]   12/19/2018  1   08/20/2018  Armodafinil 250 MG Tablet  30.00  30 Ba Loraine   [id number]   Tammy ([id number])   3   [place] Ins   [place]           OBJECTIVE:  Vitals: Wt Readings from Last 3 Encounters:   02/20/20 176 lb 12.8 oz (80.2 kg)   12/19/19 177 lb (80.3 kg)   10/21/19 181 lb 9.6 oz (82.4 kg)       There were no vitals filed for this visit. ROS: Denies trouble with fever, rash, headache, vision changes, chest pain, shortness of breath, nausea, extremity pain, weakness, dysuria. Mental Status Exam:      Appearance    unable to assess d/t f/u visit completed via pc  Muscle strength/tone: unable to assess d/t f/u visit completed via pc  Gait/station: unable to assess d/t f/u visit completed via pc    Speech    spontaneous, normal rate, soft volume and well articulated  Mood    \"up and down\"  Affect   Unable to fully assess d/t f/u visit completed via telehealth (pc) though sounds  depressed; Congruent to thought content and mood  Thought Content    intact, no delusions voiced  Thought Process    coherent   Associations    logical connections  Perceptions: denies AH/VH,   Insight    Good  Judgment    Intact  Orientation    oriented to person, place, time, and general circumstances  Memory    recent and remote memory intact  Attention/Concentration    intact  Ability to understand instructions Yes  Ability to respond meaningfully Yes  Language: [address] [place] [address] of knowledge/Intellect: Average  SI:   denies suicidal ideation  HI: Denies HI      Labs:     No visits with results within 6 Month(s) from this visit.    Latest known visit with results is: Admission on 01/28/2019, Discharged on 01/28/2019   Component Date Value    Ventricular Rate 01/28/2019 91     Atrial Rate 01/28/2019 91     P-R Interval 01/28/2019 168     QRS Duration 01/28/2019 80     Q-T Interval 01/28/2019 334     QTc Calculation (Bazett) 01/28/2019 410     P Axis 01/28/2019 34     R Axis 01/28/2019 29     T Axis 01/28/2019 12     Diagnosis 01/28/2019 Normal sinus rhythmNormal ECGNo previous ECGs availableConfirmed by [facility] Amanda GRAFF ([id number]) on 1/28/2019 5:12:09 PM        Last Drug screen:  Lab Results   Component Value Date    LABAMPH Neg 06/29/2018    LABBENZ Neg 06/29/2018    COCAIMETSCRU Neg 06/29/2018    LABMETH Neg 06/29/2018    OPIATESCREENURINE Neg 06/29/2018    PHENCYCLIDINESCREENURINE Neg 06/29/2018           Imaging: no head imaging on file        ASSESSMENT AND PLAN     Diagnosis Orders   1. EMA (generalized anxiety disorder)  LORazepam (ATIVAN) 0.5 MG tablet   2. Panic disorder without agoraphobia     3. Mood disorder ([address]     4. PTSD (post-traumatic stress disorder)          1. Safety: NO Imminent risk of danger to/self/others based on the factors considered below. Appropriate for outpatient level of care. Safety plan includes: 911, PES, hotlines, and interventions discussed today. Risk factors: depressed mood, social isolation,  no outpatient services in place, and no collateral information to support safety.     Protective factors: Age >25 and <55, female gender,  denies  suicidal ideation, does not have lethal plan, does not have access to guns or weapons, patient is beatriz for safety, no prior suicide attempts, no substance abuse, no active psychosis or cognitive dysfunction, physically healthy, and patient is future oriented.            2. Psychiatric  - had previously endorsed significant improvement in mood and anxiety symptoms with trintellix 10mg/day + armodofinil (by pulmonary). Hasn't had trintellix in months d/t limited access. - has tried/failed 3 prior ssri's   - applied @ [facility] for assistance [employer]) for Black & [employer] in October. Not yet gotten response. Wasn't able to get medicaid. ? eligible since insurance reinstated (but poor coverage)    - try to restart trintellix 10mg/day through [employer] program    12pm-12:10pm:  Call to [facility] [employer] to f/u on status of pt application. Was told \"Is pending clarification\". Something to be faxed over to our office today that needs clarification. Rep has No access to document (supervisor access only) so is unable to tell me what needs clarification and says I am unable to give clarification over phone since I am talking to them currently. Says paperwork is to be faxed to our office today 20. Supervisor is going to make call today to office as well. Rep unable to verify they have correct phone/fax #'s on     - if can't get trintellix through assistance program or samples, will consider trial pristiq (on Syntensia use as directed). [facility]23.46 [facility] with [facility]    - is prescribed armodofinil by pulmonary clinic    - historically doesn't want sedating medications. Considered seroquel. could help racing thoughts, mood and psychotic sx (feels like bugs crawling, has since improved). Declined d/t potential sedation     -  continue ativan 0.5mg bid prn   Historically Always used benzos (was on clonazepam prior to ativan) uses sparingly. Last filled 2019. Will send new Rx since that one     - never tried hydroxyzine (was ordered prn nausea and sleep). -Labs: reviewed in Epic, up to date    -unable to continue outpt therapy. Had seen Lynne Huynh at [facility], not in long time  [email or web address]   [phone]    - consider EMDR vs ptsd tx at [address]   Would prefer mood and anxiety be under better control before initiating,     -OARRS reviewed, c/w history  -R/b/se/a d/w pt who consents.     3. Medical -Following with Ashkan Buenrostro MD     4. Substance   -No active issues.     5.  RTC - 12 weeks       Randall Loomis  Psychiatric Nurse Practitioner

## 2021-04-15 ENCOUNTER — PATIENT MESSAGE (OUTPATIENT)
Dept: PSYCHIATRY | Age: 36
End: 2021-04-15

## 2021-04-16 NOTE — TELEPHONE ENCOUNTER
From: Vasquez Gee  To: Caroline Lyn, IVY - CNP  Sent: 4/15/2021 2:09 PM EDT  Subject: Prescription Question    [facility]    Is there a way I can get more samples and bring my updated documents when one of you are there?     Nehemias Chavez

## 2021-07-01 ENCOUNTER — OFFICE VISIT (OUTPATIENT)
Dept: SLEEP MEDICINE | Age: 36
End: 2021-07-01
Payer: COMMERCIAL

## 2021-07-01 VITALS
DIASTOLIC BLOOD PRESSURE: 72 MMHG | HEIGHT: 63 IN | RESPIRATION RATE: 16 BRPM | HEART RATE: 91 BPM | OXYGEN SATURATION: 97 % | SYSTOLIC BLOOD PRESSURE: 110 MMHG | BODY MASS INDEX: 29.23 KG/M2 | TEMPERATURE: 98 F | WEIGHT: 165 LBS

## 2021-07-01 DIAGNOSIS — G47.11 IDIOPATHIC HYPERSOMNOLENCE: Primary | ICD-10-CM

## 2021-07-01 PROCEDURE — 99213 OFFICE O/P EST LOW 20 MIN: CPT | Performed by: PSYCHIATRY & NEUROLOGY

## 2021-07-01 RX ORDER — ARMODAFINIL 250 MG/1
TABLET ORAL
Qty: 30 TABLET | Refills: 5 | Status: SHIPPED | OUTPATIENT
Start: 2021-07-01 | End: 2022-01-25 | Stop reason: SDUPTHER

## 2021-07-01 ASSESSMENT — ENCOUNTER SYMPTOMS: APNEA: 0

## 2021-07-01 NOTE — PROGRESS NOTES
Social Determinants of Health     Financial Resource Strain:     Difficulty of Paying Living Expenses:    Food Insecurity:     Worried About Running Out of Food in the Last Year:     [address] in the Last Year:    Transportation Needs:     Lack of Transportation (Medical):  Lack of Transportation (Non-Medical):    Physical Activity:     Days of Exercise per Week:     Minutes of Exercise per Session:    Stress:     Feeling of Stress :    Social Connections:     Frequency of Communication with Friends and Family:     Frequency of Social Gatherings with Friends and Family:     Attends Yazidism Services:     Active Member of Clubs or Organizations:     Attends Club or Organization Meetings:     Marital Status:    Intimate Partner Violence:     Fear of Current or Ex-Partner:     Emotionally Abused:     Physically Abused:     Sexually Abused:        Prior to Admission medications    Medication Sig Start Date End Date Taking?  Authorizing Provider   Armodafinil 250 MG TABS TAKE 1 TABLET BY MOUTH IN THE MORNING AS NEEDED 7/1/21 6/30/25 Yes Belle Juarez MD   VORTIoxetine (TRINTELLIX) 10 MG TABS tablet Take 1 tablet by mouth daily 4/16/21  Yes IVY Jay CNP   Multiple Vitamin (MULTI VITAMIN DAILY PO) Take by mouth   Yes Historical Provider, MD   VORTIoxetine (TRINTELLIX) 10 MG TABS tablet Take 1 tablet by mouth daily  Patient not taking: Reported on 8/20/2020 5/18/20   IVY Jay CNP       Allergies as of 07/01/2021 - Fully Reviewed 07/01/2021   Allergen Reaction Noted    Vicodin [hydrocodone-acetaminophen] Shortness Of Breath, Palpitations, and Other (See Comments) 07/31/2015       Patient Active Problem List   Diagnosis    Cervical incompetence    Tobacco dependence    Panic disorder without agoraphobia    Excessive daytime sleepiness    Vivid dream    Idiopathic hypersomnolence       Past Medical History:   Diagnosis Date    Cervical incompetence     Tobacco dependence        Past Surgical History:   Procedure Laterality Date    APPENDECTOMY  2005       Family History   Problem Relation Age of Onset    Breast Cancer Maternal Aunt 39    Cancer Maternal Grandmother         brain    Alzheimer's Disease Maternal Grandmother     Cancer Paternal Grandmother         lymphoma    High Blood Pressure Paternal Grandmother     Stroke Other        Review of Systems   Constitutional: Negative for fatigue. Respiratory: Negative for apnea. Cardiovascular: Negative for leg swelling. Genitourinary: Negative for frequency. Neurological: Negative for headaches. Objective:     Vitals:  Weight BMI Neck circumference    Wt Readings from Last 3 Encounters:   07/01/21 165 lb (74.8 kg)   02/20/20 176 lb 12.8 oz (80.2 kg)   12/19/19 177 lb (80.3 kg)    Body mass index is 29.23 kg/m². BP HR SaO2   BP Readings from Last 3 Encounters:   07/01/21 110/72   02/20/20 110/70   12/19/19 130/78    Pulse Readings from Last 3 Encounters:   07/01/21 91   02/20/20 68   12/19/19 80    SpO2 Readings from Last 3 Encounters:   07/01/21 97%   01/28/19 99%   01/24/19 98%        Themandibular molar Class :   [x]1 []2 []3      Mallampati I Airway Classification:   []1 [x]2 []3 []4      Physical Exam  Vitals and nursing note reviewed. Constitutional:       Appearance: Normal appearance. HENT:      Head: Atraumatic. Nose: Nose normal.      Mouth/Throat:      Mouth: Mucous membranes are moist.   Eyes:      Extraocular Movements: Extraocular movements intact. Cardiovascular:      Rate and Rhythm: Normal rate and regular rhythm. Heart sounds: Normal heart sounds. Pulmonary:      Effort: Pulmonary effort is normal.      Breath sounds: Normal breath sounds. Musculoskeletal:         General: Normal range of motion. Cervical back: Normal range of motion and neck supple. Skin:     General: Skin is warm.    Neurological:      General: No focal deficit present. Psychiatric:         Mood and Affect: Mood normal.         :        Diagnosis Orders   1. Idiopathic hypersomnolence  Armodafinil 250 MG TABS     Plan: Will continue the Nuvigil  Has to increase her total sleep time for 8 hours. drug contract today for [employer] . No orders of the defined types were placed in this encounter. Return in about 6 months (around 1/1/2022) for we .     Chad De Oliveira MD  Medical Director [address] [place] [address]

## 2022-01-25 ENCOUNTER — OFFICE VISIT (OUTPATIENT)
Dept: SLEEP MEDICINE | Age: 37
End: 2022-01-25
Payer: COMMERCIAL

## 2022-01-25 VITALS
WEIGHT: 162 LBS | HEIGHT: 63 IN | HEART RATE: 100 BPM | OXYGEN SATURATION: 99 % | BODY MASS INDEX: 28.7 KG/M2 | TEMPERATURE: 96.9 F | DIASTOLIC BLOOD PRESSURE: 70 MMHG | SYSTOLIC BLOOD PRESSURE: 110 MMHG | RESPIRATION RATE: 16 BRPM

## 2022-01-25 DIAGNOSIS — G47.11 IDIOPATHIC HYPERSOMNOLENCE: ICD-10-CM

## 2022-01-25 PROCEDURE — 99214 OFFICE O/P EST MOD 30 MIN: CPT | Performed by: PSYCHIATRY & NEUROLOGY

## 2022-01-25 RX ORDER — ARMODAFINIL 250 MG/1
TABLET ORAL
Qty: 30 TABLET | Refills: 5 | Status: SHIPPED | OUTPATIENT
Start: 2022-01-25 | End: 2022-07-26 | Stop reason: SDUPTHER

## 2022-01-25 ASSESSMENT — ENCOUNTER SYMPTOMS: APNEA: 0

## 2022-01-25 NOTE — PROGRESS NOTES
MD AZEB Benavides Board Certified in Sleep Medicine  Certified in [address] [place] [address] Certified in Neurology Shivani Adriana Lang [address] [place] [address],  Wallace Zamora 67  [phone]   [address] [place] [address]                      2230 [facility]  [address] [address] [phone]    Subjective:     Patient ID: Raman Villeda is a 39 y.o. female. Chief Complaint   Patient presents with    Follow-up       HPI:        Raman Villeda is a 39 y.o. female was seen today as a follow for idiopathic hypersomnolence. Comprehensive polysomnogram on 06/27/2018, the overnight registration revealed no significant sleep disordered breathing with apnea hypopnea index of 1.8/h with lowest O2 saturation of 93%, patient spent about 0 minutes below 90%. Subsequently, the patient underwent MSLT , the mean sleep onset latency was 7.2 minutes without any REM sleep, the MSLT confirmed the daytime sleepiness    The Patient scored 16 on [place] Sleepiness Scale ( more than 10 is indicative of daytime sleepiness) without the Nuvigil 250 mg and 7 with drug  Still taking 2 naps/week for 3 hours. Sleeps 7 hours  DOT/CDL - N/A        Previous Report(s)Reviewed: historical medical records         Social History     Socioeconomic History    Marital status: Single     Spouse name: Not on file    Number of children: Not on file    Years of education: Not on file    Highest education level: Not on file   Occupational History    Occupation: Medical Assistant   Tobacco Use    Smoking status: Current Every Day Smoker     Packs/day: 0.50     Years: 11.00     Pack years: 5.50     Types: Cigarettes    Smokeless tobacco: Never Used   Substance and Sexual Activity    Alcohol use:  Yes     Alcohol/week: 0.0 standard drinks     Comment: twice monthly    Drug use: No    Sexual activity: Not Currently   Other Topics Concern    Not on file   Social History Narrative    Not on file     Social Determinants of Health     Financial Resource Strain:     Difficulty of Paying Living Expenses: Not on file   Food Insecurity:     Worried About Running Out of Food in the Last Year: Not on file    [place] of Food in the Last Year: Not on file   Transportation Needs:     Lack of Transportation (Medical): Not on file    Lack of Transportation (Non-Medical): Not on file   Physical Activity:     Days of Exercise per Week: Not on file    Minutes of Exercise per Session: Not on file   Stress:     Feeling of Stress : Not on file   Social Connections:     Frequency of Communication with Friends and Family: Not on file    Frequency of Social Gatherings with Friends and Family: Not on file    Attends Nondenominational Services: Not on file    Active Member of [address] [place] [address] [employer] or Organizations: Not on file    Attends Club or Organization Meetings: Not on file    Marital Status: Not on file   Intimate Partner Violence:     Fear of Current or Ex-Partner: Not on file    Emotionally Abused: Not on file    Physically Abused: Not on file    Sexually Abused: Not on file   Housing Stability:     Unable to Pay for Housing in the Last Year: Not on file    Number of [place] in the Last Year: Not on file    Unstable Housing in the Last Year: Not on file       Prior to Admission medications    Medication Sig Start Date End Date Taking?  Authorizing Provider   Armodafinil 250 MG TABS TAKE 1 TABLET BY MOUTH IN THE MORNING AS NEEDED 1/25/22 1/24/26 Yes Hank Montes MD   VORTIoxetine (TRINTELLIX) 10 MG TABS tablet Take 1 tablet by mouth daily 4/16/21  Yes IVY Renteria - CNP   Multiple Vitamin (MULTI VITAMIN DAILY PO) Take by mouth   Yes Historical Provider, MD   VORTIoxetine (TRINTELLIX) 10 MG TABS tablet Take 1 tablet by mouth daily  Patient not taking: Reported on 8/20/2020 5/18/20   IVY Mcgovern - CNP       Allergies as of 01/25/2022 - Fully Reviewed 01/25/2022   Allergen Reaction Noted    Vicodin [hydrocodone-acetaminophen] Shortness Of Breath, Palpitations, and Other (See Comments) 07/31/2015       Patient Active Problem List   Diagnosis    Cervical incompetence    Tobacco dependence    Panic disorder without agoraphobia    Excessive daytime sleepiness    Vivid dream    Idiopathic hypersomnolence       Past Medical History:   Diagnosis Date    Cervical incompetence     Tobacco dependence        Past Surgical History:   Procedure Laterality Date    APPENDECTOMY  2005       Family History   Problem Relation Age of Onset    Breast Cancer Maternal Aunt 39    Cancer Maternal Grandmother         brain    Alzheimer's Disease Maternal Grandmother     Cancer Paternal Grandmother         lymphoma    High Blood Pressure Paternal Grandmother     Stroke Other        Review of Systems   Constitutional: Negative for fatigue. Respiratory: Negative for apnea. Genitourinary: Negative for frequency. Neurological: Negative for headaches. Objective:     Vitals:  Weight BMI Neck circumference    Wt Readings from Last 3 Encounters:   01/25/22 162 lb (73.5 kg)   07/01/21 165 lb (74.8 kg)   02/20/20 176 lb 12.8 oz (80.2 kg)    Body mass index is 28.7 kg/m². BP HR SaO2   BP Readings from Last 3 Encounters:   01/25/22 110/70   07/01/21 110/72   02/20/20 110/70    Pulse Readings from Last 3 Encounters:   01/25/22 100   07/01/21 91   02/20/20 68    SpO2 Readings from Last 3 Encounters:   01/25/22 99%   07/01/21 97%   01/28/19 99%        Themandibular molar Class :   [x]1 []2 []3      Mallampati I Airway Classification:   []1 [x]2 []3 []4      Physical Exam  Vitals and nursing note reviewed. Constitutional:       Appearance: Normal appearance. HENT:      Head: Atraumatic.       Nose: Nose normal.   Cardiovascular:      Rate and Rhythm: Normal rate and regular rhythm. Pulmonary:      Effort: Pulmonary effort is normal.      Breath sounds: Normal breath sounds. Skin:     General: Skin is warm.         :        Diagnosis Orders   1. Idiopathic hypersomnolence  Armodafinil 250 MG TABS     Plan:   Drug contract today for Nuvigil. Will continue the Nuvigil 250 mg . Continue the naps as needed. Increase the sleep duration to 7 hours+      No orders of the defined types were placed in this encounter. Return in about 6 months (around 7/25/2022).     Lio Bellamy MD  Medical Director - [facility]

## 2022-07-26 ENCOUNTER — OFFICE VISIT (OUTPATIENT)
Dept: SLEEP MEDICINE | Age: 37
End: 2022-07-26
Payer: COMMERCIAL

## 2022-07-26 VITALS
RESPIRATION RATE: 18 BRPM | OXYGEN SATURATION: 99 % | HEART RATE: 88 BPM | TEMPERATURE: 97.5 F | BODY MASS INDEX: 29.78 KG/M2 | WEIGHT: 174.4 LBS | SYSTOLIC BLOOD PRESSURE: 100 MMHG | DIASTOLIC BLOOD PRESSURE: 70 MMHG | HEIGHT: 64 IN

## 2022-07-26 DIAGNOSIS — G47.11 IDIOPATHIC HYPERSOMNOLENCE: ICD-10-CM

## 2022-07-26 DIAGNOSIS — R53.83 FATIGUE, UNSPECIFIED TYPE: ICD-10-CM

## 2022-07-26 DIAGNOSIS — G47.11 IDIOPATHIC HYPERSOMNIA: Primary | ICD-10-CM

## 2022-07-26 PROCEDURE — 99214 OFFICE O/P EST MOD 30 MIN: CPT | Performed by: PSYCHIATRY & NEUROLOGY

## 2022-07-26 RX ORDER — ARMODAFINIL 250 MG/1
TABLET ORAL
Qty: 30 TABLET | Refills: 5 | Status: SHIPPED | OUTPATIENT
Start: 2022-07-26 | End: 2026-07-25

## 2022-07-26 ASSESSMENT — SLEEP AND FATIGUE QUESTIONNAIRES
HOW LIKELY ARE YOU TO NOD OFF OR FALL ASLEEP WHILE LYING DOWN TO REST IN THE AFTERNOON WHEN CIRCUMSTANCES PERMIT: 3
HOW LIKELY ARE YOU TO NOD OFF OR FALL ASLEEP WHILE WATCHING TV: 3
HOW LIKELY ARE YOU TO NOD OFF OR FALL ASLEEP WHILE SITTING QUIETLY AFTER LUNCH WITHOUT ALCOHOL: 2
HOW LIKELY ARE YOU TO NOD OFF OR FALL ASLEEP WHILE SITTING INACTIVE IN A PUBLIC PLACE: 2
ESS TOTAL SCORE: 17
HOW LIKELY ARE YOU TO NOD OFF OR FALL ASLEEP WHILE SITTING AND TALKING TO SOMEONE: 1
HOW LIKELY ARE YOU TO NOD OFF OR FALL ASLEEP IN A CAR, WHILE STOPPED FOR A FEW MINUTES IN TRAFFIC: 1
HOW LIKELY ARE YOU TO NOD OFF OR FALL ASLEEP WHILE SITTING AND READING: 2
HOW LIKELY ARE YOU TO NOD OFF OR FALL ASLEEP WHEN YOU ARE A PASSENGER IN A CAR FOR AN HOUR WITHOUT A BREAK: 3

## 2022-07-26 ASSESSMENT — ENCOUNTER SYMPTOMS: APNEA: 0

## 2022-07-26 NOTE — PROGRESS NOTES
MD AZEB Magdaleno Board Certified in Sleep Medicine  Certified in [address] [place] [address] Certified in Neurology Shivani De Souza Lima 747 [address] [facility], R Wallace Vinicioabbynick [address]  [phone]   [address] [place] [address]                      [address] [facility] [address]  [address] [place] [address]  [phone]    Subjective:     Patient ID: Laura Rivera is a 39 y.o. female. Chief Complaint   Patient presents with    Follow-up    Insomnia       HPI:        Laura Rivera is a 39 y.o. female was seen today as a follow for obstructive idiopathic hypersomnolence. The Patient scored Total score: 17 on [place] Sleepiness Scale ( more than 10 is indicative of daytime sleepiness)   Comprehensive polysomnogram on 06/27/2018, the overnight registration revealed no significant sleep disordered breathing with apnea hypopnea index of 1.8/h with lowest O2 saturation of 93%, patient spent about 0 minutes below 90%. Subsequently, the patient underwent MSLT , the mean sleep onset latency was 7.2 minutes without any REM sleep, the MSLT confirmed the daytime sleepiness  Still taking 2 naps/week for 1-2 hours. Takes the Nuvigil at 6 AM  DOT/CDL - N/A        Previous Report(s)Reviewed: historical medical records         Social History     Socioeconomic History    Marital status: Single     Spouse name: Not on file    Number of children: Not on file    Years of education: Not on file    Highest education level: Not on file   Occupational History    Occupation: Medical Assistant   Tobacco Use    Smoking status: Every Day     Packs/day: 0.50     Years: 11.00     Pack years: 5.50     Types: Cigarettes    Smokeless tobacco: Never   Vaping Use    Vaping Use: Never used   Substance and Sexual Activity    Alcohol use:  Yes     Alcohol/week: 0.0 standard drinks     Comment: twice monthly    Drug use: No    Sexual activity: Not Currently   Other Topics Concern    Not on file   Social History Narrative    Not on file     Social Determinants of Health     Financial Resource Strain: Not on file   Food Insecurity: Not on file   Transportation Needs: Not on file   Physical Activity: Not on file   Stress: Not on file   Social Connections: Not on file   Intimate Partner Violence: Not on file   Housing Stability: Not on file       Prior to Admission medications    Medication Sig Start Date End Date Taking?  Authorizing Provider   Armodafinil 250 MG TABS TAKE 1 TABLET BY MOUTH IN THE MORNING AS NEEDED 7/26/22 7/25/26 Yes Luc Last MD   Multiple Vitamin (MULTI VITAMIN DAILY PO) Take by mouth   Yes Historical Provider, MD   VORTIoxetine (TRINTELLIX) 10 MG TABS tablet Take 1 tablet by mouth daily  Patient not taking: Reported on 7/26/2022 4/16/21   Loetta Harada, APRN - CNP   VORTIoxetine (TRINTELLIX) 10 MG TABS tablet Take 1 tablet by mouth daily  Patient not taking: No sig reported 5/18/20   Loetta Harada, APRN - CNP       Allergies as of 07/26/2022 - Fully Reviewed 07/26/2022   Allergen Reaction Noted    Vicodin [hydrocodone-acetaminophen] Shortness Of Breath, Palpitations, and Other (See Comments) 07/31/2015       Patient Active Problem List   Diagnosis    Cervical incompetence    Tobacco dependence    Panic disorder without agoraphobia    Excessive daytime sleepiness    Vivid dream    Idiopathic hypersomnolence       Past Medical History:   Diagnosis Date    Cervical incompetence     Tobacco dependence        Past Surgical History:   Procedure Laterality Date    APPENDECTOMY  2005       Family History   Problem Relation Age of Onset    Breast Cancer Maternal Aunt 39    Cancer Maternal Grandmother         brain    Alzheimer's Disease Maternal Grandmother     Cancer Paternal Grandmother         lymphoma    High Blood Pressure Paternal Grandmother     Stroke Other        Review of Systems   Constitutional:  Positive for fatigue. Respiratory:  Negative for apnea. Cardiovascular:  Negative for leg swelling. Neurological:  Negative for headaches. Objective:     Vitals:  Weight BMI Neck circumference    Wt Readings from Last 3 Encounters:   07/26/22 174 lb 6.4 oz (79.1 kg)   01/25/22 162 lb (73.5 kg)   07/01/21 165 lb (74.8 kg)    Body mass index is 30.17 kg/m². BP HR SaO2   BP Readings from Last 3 Encounters:   07/26/22 100/70   01/25/22 110/70   07/01/21 110/72    Pulse Readings from Last 3 Encounters:   07/26/22 88   01/25/22 100   07/01/21 91    SpO2 Readings from Last 3 Encounters:   07/26/22 99%   01/25/22 99%   07/01/21 97%        Themandibular molar Class :   [x]1 []2 []3      Mallampati I Airway Classification:   []1 [x]2 []3 []4      Physical Exam  Vitals and nursing note reviewed. Cardiovascular:      Rate and Rhythm: Normal rate and regular rhythm. Pulmonary:      Effort: Pulmonary effort is normal.      Breath sounds: Normal breath sounds. Musculoskeletal:      Right lower leg: No edema. Left lower leg: No edema.       :      Diagnosis Orders   1. Idiopathic hypersomnia        2. Idiopathic hypersomnolence  Armodafinil 250 MG TABS      3. Fatigue, unspecified type          Plan: Will continue the Nuvigil 250 mg QAM PRN      OARRS report reviewed and is consistent with the plan of care. The patient understands the risks of dependency and addiction with above medication. Plan to take lowest therapeutic dose possible and discontinue as soon as possible. Yearly OARRS report, controlled substance agreement and urine tox screen. No orders of the defined types were placed in this encounter. Return in about 6 months (around 1/26/2023) for EDS.     Cale Lau MD  Medical Director - [facility]

## 2023-02-08 ENCOUNTER — OFFICE VISIT (OUTPATIENT)
Dept: SLEEP MEDICINE | Age: 38
End: 2023-02-08

## 2023-02-08 VITALS
WEIGHT: 181.6 LBS | DIASTOLIC BLOOD PRESSURE: 70 MMHG | TEMPERATURE: 97.3 F | HEIGHT: 64 IN | HEART RATE: 88 BPM | RESPIRATION RATE: 20 BRPM | SYSTOLIC BLOOD PRESSURE: 115 MMHG | BODY MASS INDEX: 31 KG/M2 | OXYGEN SATURATION: 99 %

## 2023-02-08 DIAGNOSIS — G47.11 IDIOPATHIC HYPERSOMNOLENCE: Primary | ICD-10-CM

## 2023-02-08 DIAGNOSIS — G47.19 EXCESSIVE DAYTIME SLEEPINESS: ICD-10-CM

## 2023-02-08 RX ORDER — ARMODAFINIL 250 MG/1
TABLET ORAL
Qty: 30 TABLET | Refills: 5 | Status: SHIPPED | OUTPATIENT
Start: 2023-02-08 | End: 2027-02-07

## 2023-02-08 ASSESSMENT — SLEEP AND FATIGUE QUESTIONNAIRES
HOW LIKELY ARE YOU TO NOD OFF OR FALL ASLEEP WHILE WATCHING TV: 3
HOW LIKELY ARE YOU TO NOD OFF OR FALL ASLEEP IN A CAR, WHILE STOPPED FOR A FEW MINUTES IN TRAFFIC: 1
HOW LIKELY ARE YOU TO NOD OFF OR FALL ASLEEP WHILE SITTING AND TALKING TO SOMEONE: 2
HOW LIKELY ARE YOU TO NOD OFF OR FALL ASLEEP WHILE SITTING QUIETLY AFTER LUNCH WITHOUT ALCOHOL: 2
ESS TOTAL SCORE: 16
HOW LIKELY ARE YOU TO NOD OFF OR FALL ASLEEP WHEN YOU ARE A PASSENGER IN A CAR FOR AN HOUR WITHOUT A BREAK: 1
HOW LIKELY ARE YOU TO NOD OFF OR FALL ASLEEP WHILE LYING DOWN TO REST IN THE AFTERNOON WHEN CIRCUMSTANCES PERMIT: 3
HOW LIKELY ARE YOU TO NOD OFF OR FALL ASLEEP WHILE SITTING AND READING: 2
HOW LIKELY ARE YOU TO NOD OFF OR FALL ASLEEP WHILE SITTING INACTIVE IN A PUBLIC PLACE: 2

## 2023-02-08 NOTE — PROGRESS NOTES
MD AZEB Donaldson Board Certified in Sleep Medicine  Certified in [address] [place] [address] Certified in Neurology Shivani Adriana Tse De Lima [address] [place] [address],  Wallace Gwentatiannanick   [phone]   [address] [place] [address]                      [address] [facility] [address]  [address] [address] [phone]    Subjective:     Patient ID: Chao Noble is a 40 y.o. female. Chief Complaint   Patient presents with    Follow-up    Insomnia         HPI:        Chao Noble is a 40 y.o. female was seen today as a follow for  idiopathic hypersomnolence. Comprehensive polysomnogram on 06/27/2018, the overnight registration revealed no significant sleep disordered breathing with apnea hypopnea index of 1.8/h with lowest O2 saturation of 93%, patient spent about 0 minutes below 90%. Subsequently, the patient underwent MSLT , the mean sleep onset latency was 7.2 minutes without any REM sleep, the MSLT confirmed the daytime sleepiness  The Patient scored [place] Sleepiness Score: 16 on [place] Sleepiness Scale ( more than 10 is indicative of daytime sleepiness)   Works from home . Takes naps as needed for EDS.      DOT/CDL - N/A        Previous Report(s)Reviewed: historical medical records         Social History     Socioeconomic History    Marital status: Single     Spouse name: Not on file    Number of children: Not on file    Years of education: Not on file    Highest education level: Not on file   Occupational History    Occupation: Medical Assistant   Tobacco Use    Smoking status: Every Day     Packs/day: 0.50     Years: 11.00     Pack years: 5.50     Types: Cigarettes     Passive exposure: Past    Smokeless tobacco: Never   Vaping Use    Vaping Use: Never used   Substance and Sexual Activity    Alcohol use:  Yes Alcohol/week: 0.0 standard drinks     Comment: twice monthly    Drug use: No    Sexual activity: Not Currently   Other Topics Concern    Not on file   Social History Narrative    Not on file     Social Determinants of Health     Financial Resource Strain: Not on file   Food Insecurity: Not on file   Transportation Needs: Not on file   Physical Activity: Not on file   Stress: Not on file   Social Connections: Not on file   Intimate Partner Violence: Not on file   Housing Stability: Not on file       Prior to Admission medications    Medication Sig Start Date End Date Taking?  Authorizing Provider   Armodafinil 250 MG TABS TAKE 1 TABLET BY MOUTH IN THE MORNING AS NEEDED 2/8/23 2/7/27 Yes Alberto Barnett MD   Multiple Vitamin (MULTI VITAMIN DAILY PO) Take by mouth   Yes Historical Provider, MD   VORTIoxetine (TRINTELLIX) 10 MG TABS tablet Take 1 tablet by mouth daily  Patient not taking: Reported on 7/26/2022 4/16/21   IVY Walter CNP   VORTIoxetine (TRINTELLIX) 10 MG TABS tablet Take 1 tablet by mouth daily  Patient not taking: No sig reported 5/18/20   IVY Walter CNP       Allergies as of 02/08/2023 - Fully Reviewed 02/08/2023   Allergen Reaction Noted    Vicodin [hydrocodone-acetaminophen] Shortness Of Breath, Palpitations, and Other (See Comments) 07/31/2015       Patient Active Problem List   Diagnosis    Cervical incompetence    Tobacco dependence    Panic disorder without agoraphobia    Excessive daytime sleepiness    Vivid dream    Idiopathic hypersomnolence       Past Medical History:   Diagnosis Date    Cervical incompetence     Tobacco dependence        Past Surgical History:   Procedure Laterality Date    APPENDECTOMY  2005       Family History   Problem Relation Age of Onset    Breast Cancer Maternal Aunt 39    Cancer Maternal Grandmother         brain    Alzheimer's Disease Maternal Grandmother     Cancer Paternal Grandmother         lymphoma    High Blood Pressure Paternal Grandmother     Stroke Other        Review of Systems    Objective:     Vitals:  Weight BMI Neck circumference    Wt Readings from Last 3 Encounters:   02/08/23 181 lb 9.6 oz (82.4 kg)   07/26/22 174 lb 6.4 oz (79.1 kg)   01/25/22 162 lb (73.5 kg)    Body mass index is 31.42 kg/m². BP HR SaO2   BP Readings from Last 3 Encounters:   02/08/23 115/70   07/26/22 100/70   01/25/22 110/70    Pulse Readings from Last 3 Encounters:   02/08/23 88   07/26/22 88   01/25/22 100    SpO2 Readings from Last 3 Encounters:   02/08/23 99%   07/26/22 99%   01/25/22 99%        Themandibular molar Class :   [x]1 []2 []3      Mallampati I Airway Classification:   []1 [x]2 []3 []4      Physical Exam  Vitals and nursing note reviewed. Cardiovascular:      Rate and Rhythm: Normal rate and regular rhythm. Pulmonary:      Effort: Pulmonary effort is normal.      Breath sounds: Normal breath sounds. Musculoskeletal:      Right lower leg: No edema. Left lower leg: No edema.       :          Diagnosis Orders   1. Idiopathic hypersomnolence  Armodafinil 250 MG TABS      2. Excessive daytime sleepiness          Plan: Will continue the Nuvigil 250 mg QAM    OARRS report reviewed and is consistent with the plan of care. The patient understands the risks of dependency and addiction with above medication. Plan to take lowest therapeutic dose possible and discontinue as soon as possible. Yearly OARRS report, controlled substance agreement and urine tox screen. .        No orders of the defined types were placed in this encounter. Return for EDS.     Enrique Duggan MD  Medical Director - [facility]

## 2023-08-08 DIAGNOSIS — G47.11 IDIOPATHIC HYPERSOMNOLENCE: ICD-10-CM

## 2023-08-08 RX ORDER — ARMODAFINIL 250 MG/1
TABLET ORAL
Qty: 30 TABLET | Refills: 5 | Status: SHIPPED | OUTPATIENT
Start: 2023-08-08 | End: 2027-08-13

## 2024-02-20 DIAGNOSIS — G47.11 IDIOPATHIC HYPERSOMNOLENCE: ICD-10-CM

## 2024-02-20 RX ORDER — ARMODAFINIL 250 MG/1
TABLET ORAL
Qty: 30 TABLET | Refills: 0 | OUTPATIENT
Start: 2024-02-20

## 2024-11-13 ENCOUNTER — OFFICE VISIT (OUTPATIENT)
Dept: SLEEP MEDICINE | Age: 39
End: 2024-11-13

## 2024-11-13 VITALS
BODY MASS INDEX: 37.28 KG/M2 | TEMPERATURE: 97.3 F | HEIGHT: 63 IN | DIASTOLIC BLOOD PRESSURE: 80 MMHG | WEIGHT: 210.4 LBS | RESPIRATION RATE: 18 BRPM | OXYGEN SATURATION: 98 % | HEART RATE: 103 BPM | SYSTOLIC BLOOD PRESSURE: 130 MMHG

## 2024-11-13 DIAGNOSIS — G47.11 IDIOPATHIC HYPERSOMNOLENCE: Primary | ICD-10-CM

## 2024-11-13 DIAGNOSIS — G47.11 IDIOPATHIC HYPERSOMNIA: ICD-10-CM

## 2024-11-13 PROCEDURE — 99214 OFFICE O/P EST MOD 30 MIN: CPT | Performed by: PSYCHIATRY & NEUROLOGY

## 2024-11-13 RX ORDER — ARMODAFINIL 250 MG/1
TABLET ORAL
Qty: 30 TABLET | Refills: 5 | Status: SHIPPED | OUTPATIENT
Start: 2024-11-13 | End: 2026-10-29

## 2024-11-13 ASSESSMENT — SLEEP AND FATIGUE QUESTIONNAIRES
HOW LIKELY ARE YOU TO NOD OFF OR FALL ASLEEP IN A CAR, WHILE STOPPED FOR A FEW MINUTES IN TRAFFIC: SLIGHT CHANCE OF DOZING
HOW LIKELY ARE YOU TO NOD OFF OR FALL ASLEEP WHEN YOU ARE A PASSENGER IN A CAR FOR AN HOUR WITHOUT A BREAK: HIGH CHANCE OF DOZING
HOW LIKELY ARE YOU TO NOD OFF OR FALL ASLEEP WHILE SITTING AND TALKING TO SOMEONE: SLIGHT CHANCE OF DOZING
HOW LIKELY ARE YOU TO NOD OFF OR FALL ASLEEP WHILE LYING DOWN TO REST IN THE AFTERNOON WHEN CIRCUMSTANCES PERMIT: HIGH CHANCE OF DOZING
HOW LIKELY ARE YOU TO NOD OFF OR FALL ASLEEP WHILE SITTING QUIETLY AFTER LUNCH WITHOUT ALCOHOL: MODERATE CHANCE OF DOZING
HOW LIKELY ARE YOU TO NOD OFF OR FALL ASLEEP WHILE SITTING AND READING: HIGH CHANCE OF DOZING
ESS TOTAL SCORE: 17
HOW LIKELY ARE YOU TO NOD OFF OR FALL ASLEEP WHILE SITTING INACTIVE IN A PUBLIC PLACE: MODERATE CHANCE OF DOZING

## 2024-11-13 NOTE — PROGRESS NOTES
MD AZEB Toribio Board Certified in Sleep Medicine  Certified in Behavioral Sleep Medicine  Board Certified in Neurology [facility]  [address]   [address]  [place]  [address]  [phone]   [facility]  [address] [facility]  [address]  [place], [place] [address]                      [facility] PHYSICIANS [place] [facility] SLEEP [facility]IC[facility]  [address] [facility] [address] [phone]    Subjective:     Patient ID: Dottie Barone is a 39 y.o. female.    Chief Complaint   Patient presents with    Follow-up         HPI:        Dottie Barone is a 39 y.o. female was seen today as a follow for  idiopathic hypersomnolence.  Lost the follow up 20 months ago, but was doing great on the Nuvigil.   Comprehensive polysomnogram on 06/27/2018, the overnight registration revealed no significant sleep disordered breathing with apnea hypopnea index of 1.8/h with lowest O2 saturation of 93%, patient spent about 0 minutes below 90%. Subsequently, the patient underwent MSLT , the mean sleep onset latency was 7.2 minutes without any REM sleep, the MSLT confirmed the daytime sleepiness  The Patient scored [place] Sleepiness Score: 17 on [place] Sleepiness Scale ( more than 10 is indicative of daytime sleepiness)   Works from home . Takes naps as needed for EDS.      DOT/CDL - N/A        Previous Report(s)Reviewed: historical medical records         Social History     Socioeconomic History    Marital status: Single     Spouse name: Not on file    Number of children: Not on file    Years of education: Not on file    Highest education level: Not on file   Occupational History    Occupation: Medical Assistant   Tobacco Use    Smoking status: Every Day     Current packs/day: 0.50     Average packs/day: 0.5 packs/day for 11.0 years (5.5 ttl pk-yrs)     Types: Cigarettes     Passive exposure: Past    Smokeless tobacco: Never

## 2025-01-08 ENCOUNTER — HOSPITAL ENCOUNTER (OUTPATIENT)
Age: 40
Discharge: HOME | End: 2025-01-08
Payer: COMMERCIAL

## 2025-01-08 PROCEDURE — 93005 ELECTROCARDIOGRAM TRACING: CPT | Performed by: NURSE PRACTITIONER

## 2025-01-09 LAB
EKG ATRIAL RATE: 98 BPM
EKG DIAGNOSIS: NORMAL
EKG P AXIS: 31 DEGREES
EKG P-R INTERVAL: 168 MS
EKG Q-T INTERVAL: 336 MS
EKG QRS DURATION: 78 MS
EKG QTC CALCULATION (BAZETT): 428 MS
EKG R AXIS: 41 DEGREES
EKG T AXIS: 17 DEGREES
EKG VENTRICULAR RATE: 98 BPM

## 2025-01-09 PROCEDURE — 93010 ELECTROCARDIOGRAM REPORT: CPT | Performed by: INTERNAL MEDICINE

## 2025-05-13 ASSESSMENT — SLEEP AND FATIGUE QUESTIONNAIRES
HOW LIKELY ARE YOU TO NOD OFF OR FALL ASLEEP WHILE SITTING AND TALKING TO SOMEONE: SLIGHT CHANCE OF DOZING
HOW LIKELY ARE YOU TO NOD OFF OR FALL ASLEEP IN A CAR, WHILE STOPPED FOR A FEW MINUTES IN TRAFFIC: SLIGHT CHANCE OF DOZING
HOW LIKELY ARE YOU TO NOD OFF OR FALL ASLEEP WHILE SITTING INACTIVE IN A PUBLIC PLACE: MODERATE CHANCE OF DOZING
HOW LIKELY ARE YOU TO NOD OFF OR FALL ASLEEP WHILE SITTING AND READING: MODERATE CHANCE OF DOZING
HOW LIKELY ARE YOU TO NOD OFF OR FALL ASLEEP WHILE LYING DOWN TO REST IN THE AFTERNOON WHEN CIRCUMSTANCES PERMIT: HIGH CHANCE OF DOZING
HOW LIKELY ARE YOU TO NOD OFF OR FALL ASLEEP IN A CAR, WHILE STOPPED FOR A FEW MINUTES IN TRAFFIC: SLIGHT CHANCE OF DOZING
HOW LIKELY ARE YOU TO NOD OFF OR FALL ASLEEP WHILE WATCHING TV: MODERATE CHANCE OF DOZING
HOW LIKELY ARE YOU TO NOD OFF OR FALL ASLEEP WHILE SITTING QUIETLY AFTER LUNCH WITHOUT ALCOHOL: SLIGHT CHANCE OF DOZING
ESS TOTAL SCORE: 13
HOW LIKELY ARE YOU TO NOD OFF OR FALL ASLEEP WHEN YOU ARE A PASSENGER IN A CAR FOR AN HOUR WITHOUT A BREAK: SLIGHT CHANCE OF DOZING
HOW LIKELY ARE YOU TO NOD OFF OR FALL ASLEEP WHILE SITTING INACTIVE IN A PUBLIC PLACE: MODERATE CHANCE OF DOZING
HOW LIKELY ARE YOU TO NOD OFF OR FALL ASLEEP WHILE SITTING QUIETLY AFTER LUNCH WITHOUT ALCOHOL: SLIGHT CHANCE OF DOZING
HOW LIKELY ARE YOU TO NOD OFF OR FALL ASLEEP WHILE SITTING AND TALKING TO SOMEONE: SLIGHT CHANCE OF DOZING
HOW LIKELY ARE YOU TO NOD OFF OR FALL ASLEEP WHILE WATCHING TV: MODERATE CHANCE OF DOZING
HOW LIKELY ARE YOU TO NOD OFF OR FALL ASLEEP WHILE LYING DOWN TO REST IN THE AFTERNOON WHEN CIRCUMSTANCES PERMIT: HIGH CHANCE OF DOZING
HOW LIKELY ARE YOU TO NOD OFF OR FALL ASLEEP WHEN YOU ARE A PASSENGER IN A CAR FOR AN HOUR WITHOUT A BREAK: SLIGHT CHANCE OF DOZING
HOW LIKELY ARE YOU TO NOD OFF OR FALL ASLEEP WHILE SITTING AND READING: MODERATE CHANCE OF DOZING

## 2025-05-14 ENCOUNTER — OFFICE VISIT (OUTPATIENT)
Dept: SLEEP MEDICINE | Age: 40
End: 2025-05-14
Payer: COMMERCIAL

## 2025-05-14 VITALS
RESPIRATION RATE: 18 BRPM | DIASTOLIC BLOOD PRESSURE: 85 MMHG | HEIGHT: 64 IN | SYSTOLIC BLOOD PRESSURE: 124 MMHG | HEART RATE: 99 BPM | OXYGEN SATURATION: 95 % | WEIGHT: 212.8 LBS | BODY MASS INDEX: 36.33 KG/M2 | TEMPERATURE: 97.9 F

## 2025-05-14 DIAGNOSIS — G47.11 IDIOPATHIC HYPERSOMNIA: Primary | ICD-10-CM

## 2025-05-14 PROCEDURE — 99214 OFFICE O/P EST MOD 30 MIN: CPT | Performed by: PSYCHIATRY & NEUROLOGY

## 2025-05-14 RX ORDER — ARMODAFINIL 250 MG/1
TABLET ORAL
Qty: 30 TABLET | Refills: 5 | Status: SHIPPED | OUTPATIENT
Start: 2025-05-14 | End: 2027-04-29

## 2025-05-14 NOTE — PROGRESS NOTES
MD AZEB Toribio Board Certified in Sleep Medicine  Certified in Behavioral Sleep Medicine  Board Certified in Neurology [facility]  [address]   [address]  [place]  [address]  [phone]   [facility]  [address]  [address]  [place], [place] [address]                      [facility] PHYSICIANS [place] [facility] SLEEP [facility]IC[facility]  [address] [id number] [phone]    Subjective:     Patient ID: Dottie Barone is a 39 y.o. female.    Chief Complaint   Patient presents with    Follow-up    Idiopathic hypersomnolence         HPI:        Dottie Barone is a 39 y.o. female was seen today as a 6 months follow for  idiopathic hypersomnia.  Sill doing great on the Nuvigil, helps her to stay awake, no side effects.   Comprehensive polysomnogram on 06/27/2018, the overnight registration revealed no significant sleep disordered breathing with apnea hypopnea index of 1.8/h with lowest O2 saturation of 93%, patient spent about 0 minutes below 90%. Subsequently, the patient underwent MSLT , the mean sleep onset latency was 7.2 minutes without any REM sleep, the MSLT confirmed the daytime sleepiness  The Patient scored [place] Sleepiness Score: (Patient-Rptd) 13 on [place] Sleepiness Scale ( more than 10 is indicative of daytime sleepiness)   Works from home . Takes naps as needed for EDS.      DOT/CDL - N/A        Previous Report(s)Reviewed: historical medical records         Social History     Socioeconomic History    Marital status: Single     Spouse name: Not on file    Number of children: Not on file    Years of education: Not on file    Highest education level: Not on file   Occupational History    Occupation: Medical Assistant   Tobacco Use    Smoking status: Every Day     Current packs/day: 0.50     Average packs/day: 0.5 packs/day for 11.0 years (5.5 ttl pk-yrs)     Types: Cigarettes